# Patient Record
Sex: FEMALE | Race: WHITE | NOT HISPANIC OR LATINO | URBAN - NONMETROPOLITAN AREA
[De-identification: names, ages, dates, MRNs, and addresses within clinical notes are randomized per-mention and may not be internally consistent; named-entity substitution may affect disease eponyms.]

---

## 2021-01-26 ENCOUNTER — OFFICE VISIT (OUTPATIENT)
Dept: URBAN - NONMETROPOLITAN AREA CLINIC 13 | Facility: CLINIC | Age: 71
End: 2021-01-26
Payer: MEDICARE

## 2021-01-26 DIAGNOSIS — R11.0 NAUSEA: ICD-10-CM

## 2021-01-26 DIAGNOSIS — K62.5 BRIGHT RED BLOOD PER RECTUM: ICD-10-CM

## 2021-01-26 DIAGNOSIS — K59.00 CONSTIPATION, UNSPECIFIED CONSTIPATION TYPE: ICD-10-CM

## 2021-01-26 DIAGNOSIS — K21.9 GERD: ICD-10-CM

## 2021-01-26 PROCEDURE — G8427 DOCREV CUR MEDS BY ELIG CLIN: HCPCS | Performed by: NURSE PRACTITIONER

## 2021-01-26 PROCEDURE — G8420 CALC BMI NORM PARAMETERS: HCPCS | Performed by: NURSE PRACTITIONER

## 2021-01-26 PROCEDURE — 3017F COLORECTAL CA SCREEN DOC REV: CPT | Performed by: NURSE PRACTITIONER

## 2021-01-26 PROCEDURE — 99213 OFFICE O/P EST LOW 20 MIN: CPT | Performed by: NURSE PRACTITIONER

## 2021-01-26 RX ORDER — PANTOPRAZOLE SODIUM 40 MG/1
1 TABLET TABLET, DELAYED RELEASE ORAL BID
Qty: 180 TABLET | Refills: 3 | OUTPATIENT
Start: 2021-01-26

## 2021-01-26 RX ORDER — ONDANSETRON HYDROCHLORIDE 4 MG/1
1 TABLET EVERY 6-8HRS AS NEEDED FOR NAUSEA TABLET, FILM COATED ORAL
Qty: 30 | Refills: 3 | OUTPATIENT
Start: 2021-01-26

## 2021-01-26 RX ORDER — CLONAZEPAM 0.5 MG/1
TAKE 1 TABLET BY ORAL ROUTE ONCE A DAY (AT BEDTIME) TABLET ORAL 1
Qty: 0 | Refills: 0 | Status: ACTIVE | COMMUNITY
Start: 1900-01-01

## 2021-01-26 RX ORDER — ESTRADIOL 10 UG/1
INSERT 1 TABLET (10 MCG) BY VAGINAL ROUTE TWICE WEEKLY INSERT VAGINAL
Qty: 1 | Refills: 0 | Status: ACTIVE | COMMUNITY
Start: 1900-01-01

## 2021-01-26 RX ORDER — DICLOFENAC SODIUM 10 MG/G
APPLY 2 GRAM TO THE AFFECTED AREA(S) BY TOPICAL ROUTE 4 TIMES PER DAY GEL TOPICAL
Qty: 1 | Refills: 0 | Status: ACTIVE | COMMUNITY
Start: 1900-01-01

## 2021-01-26 RX ORDER — TRAMADOL HYDROCHLORIDE 50 MG/1
TAKE 1 TABLET (50 MG) BY ORAL ROUTE EVERY 6 HOURS AS NEEDED TABLET ORAL
Qty: 0 | Refills: 0 | Status: ACTIVE | COMMUNITY
Start: 1900-01-01

## 2021-01-26 RX ORDER — LISINOPRIL AND HYDROCHLOROTHIAZIDE TABLETS 10; 12.5 MG/1; MG/1
TAKE 1 TABLET BY ORAL ROUTE ONCE DAILY TABLET ORAL 1
Qty: 0 | Refills: 0 | Status: ACTIVE | COMMUNITY
Start: 1900-01-01

## 2021-01-26 RX ORDER — ADALIMUMAB 40MG/0.8ML
INJECT 0.8 MILLILITER (40 MG) BY SUBCUTANEOUS ROUTE EVERY 2 WEEKS KIT SUBCUTANEOUS
Qty: 1 | Refills: 0 | Status: ACTIVE | COMMUNITY
Start: 1900-01-01

## 2021-01-26 RX ORDER — RABEPRAZOLE SODIUM 20 MG/1
TAKE 1 TABLET (20 MG) BY ORAL ROUTE ONCE DAILY SWALLOWING WHOLE. DO NOT CRUSH, CHEW AND/OR DIVIDE TABLET, DELAYED RELEASE ORAL 1
Qty: 0 | Refills: 0 | Status: ACTIVE | COMMUNITY
Start: 1900-01-01

## 2021-01-26 NOTE — HPI-OTHER HISTORIES
8/21/2019 Bettye is a new patient referred for gastritis. She states that she has had issues with dysphagia over the last few months. She has long standing reflux and has been on aciphex. More recently she feels like this is not as effective. She has solid food dysphagia. She has not had any liquid food dysphagia. She has a history of candidal esophagitis in the past while she is on prednisone. She is on Humira now for RA EGD was essentially normal. negative for PUD or H pylori. Today she returns and is feeling well. She thinks some of her upper Gi sx are anxiety related. Things are somewhat better now. She does not want to change from Aciphex at this point. She is having some constipation. She is using prn Dulcolax. Miralax causedbloating. her weight is stable. 8/21/19 Bettye returns for follow up/for a new issue. She reports that she has had longstanding issues with mild constipation. She has taken dulcolax for this. Now she is requiring addition of miralax and she does notice some increasing bloating and gas with miralax. She had also had worsening loose stools after starting miralax sos she has had to cut back. She is wondering if there are other options. She is not having any blood in her stools. She has a decreased appetite and some foods taste different to her. She denies any otehr abdominal sx. Her upper GI symptoms seem much improved since we saw her in 2017

## 2021-02-08 ENCOUNTER — TELEPHONE ENCOUNTER (OUTPATIENT)
Dept: URBAN - METROPOLITAN AREA CLINIC 92 | Facility: CLINIC | Age: 71
End: 2021-02-08

## 2021-02-08 RX ORDER — DICLOFENAC SODIUM 10 MG/G
APPLY 2 GRAM TO THE AFFECTED AREA(S) BY TOPICAL ROUTE 4 TIMES PER DAY GEL TOPICAL
Qty: 1 | Refills: 0 | Status: ACTIVE | COMMUNITY
Start: 1900-01-01

## 2021-02-08 RX ORDER — CLONAZEPAM 0.5 MG/1
TAKE 1 TABLET BY ORAL ROUTE ONCE A DAY (AT BEDTIME) TABLET ORAL 1
Qty: 0 | Refills: 0 | Status: ACTIVE | COMMUNITY
Start: 1900-01-01

## 2021-02-08 RX ORDER — PANTOPRAZOLE SODIUM 40 MG/1
1 TABLET TABLET, DELAYED RELEASE ORAL BID
Qty: 180 TABLET | Refills: 3 | Status: ACTIVE | COMMUNITY
Start: 2021-01-26

## 2021-02-08 RX ORDER — RABEPRAZOLE SODIUM 20 MG/1
TAKE 1 TABLET (20 MG) BY ORAL ROUTE ONCE DAILY SWALLOWING WHOLE. DO NOT CRUSH, CHEW AND/OR DIVIDE TABLET, DELAYED RELEASE ORAL 1
Qty: 0 | Refills: 0 | Status: ACTIVE | COMMUNITY
Start: 1900-01-01

## 2021-02-08 RX ORDER — AMITRIPTYLINE HYDROCHLORIDE 10 MG/1
1 TABLET AT BEDTIME TABLET, FILM COATED ORAL ONCE A DAY
Qty: 90 TABLET | Refills: 3 | OUTPATIENT
Start: 2021-02-08

## 2021-02-08 RX ORDER — ADALIMUMAB 40MG/0.8ML
INJECT 0.8 MILLILITER (40 MG) BY SUBCUTANEOUS ROUTE EVERY 2 WEEKS KIT SUBCUTANEOUS
Qty: 1 | Refills: 0 | Status: ACTIVE | COMMUNITY
Start: 1900-01-01

## 2021-02-08 RX ORDER — ONDANSETRON HYDROCHLORIDE 4 MG/1
1 TABLET EVERY 6-8HRS AS NEEDED FOR NAUSEA TABLET, FILM COATED ORAL
Qty: 30 | Refills: 3 | Status: ACTIVE | COMMUNITY
Start: 2021-01-26

## 2021-02-08 RX ORDER — ESTRADIOL 10 UG/1
INSERT 1 TABLET (10 MCG) BY VAGINAL ROUTE TWICE WEEKLY INSERT VAGINAL
Qty: 1 | Refills: 0 | Status: ACTIVE | COMMUNITY
Start: 1900-01-01

## 2021-02-08 RX ORDER — TRAMADOL HYDROCHLORIDE 50 MG/1
TAKE 1 TABLET (50 MG) BY ORAL ROUTE EVERY 6 HOURS AS NEEDED TABLET ORAL
Qty: 0 | Refills: 0 | Status: ACTIVE | COMMUNITY
Start: 1900-01-01

## 2021-02-08 RX ORDER — LISINOPRIL AND HYDROCHLOROTHIAZIDE TABLETS 10; 12.5 MG/1; MG/1
TAKE 1 TABLET BY ORAL ROUTE ONCE DAILY TABLET ORAL 1
Qty: 0 | Refills: 0 | Status: ACTIVE | COMMUNITY
Start: 1900-01-01

## 2021-02-16 ENCOUNTER — OFFICE VISIT (OUTPATIENT)
Dept: URBAN - NONMETROPOLITAN AREA CLINIC 13 | Facility: CLINIC | Age: 71
End: 2021-02-16
Payer: MEDICARE

## 2021-02-16 ENCOUNTER — LAB OUTSIDE AN ENCOUNTER (OUTPATIENT)
Dept: URBAN - NONMETROPOLITAN AREA CLINIC 13 | Facility: CLINIC | Age: 71
End: 2021-02-16

## 2021-02-16 VITALS
SYSTOLIC BLOOD PRESSURE: 139 MMHG | TEMPERATURE: 98 F | BODY MASS INDEX: 22.98 KG/M2 | DIASTOLIC BLOOD PRESSURE: 85 MMHG | WEIGHT: 143 LBS | HEART RATE: 73 BPM | HEIGHT: 66 IN

## 2021-02-16 DIAGNOSIS — K21.9 GERD: ICD-10-CM

## 2021-02-16 DIAGNOSIS — R11.0 NAUSEA: ICD-10-CM

## 2021-02-16 DIAGNOSIS — K59.00 CONSTIPATION, UNSPECIFIED CONSTIPATION TYPE: ICD-10-CM

## 2021-02-16 DIAGNOSIS — K62.5 BRIGHT RED BLOOD PER RECTUM: ICD-10-CM

## 2021-02-16 PROCEDURE — 3017F COLORECTAL CA SCREEN DOC REV: CPT | Performed by: NURSE PRACTITIONER

## 2021-02-16 PROCEDURE — G8427 DOCREV CUR MEDS BY ELIG CLIN: HCPCS | Performed by: NURSE PRACTITIONER

## 2021-02-16 PROCEDURE — G8420 CALC BMI NORM PARAMETERS: HCPCS | Performed by: NURSE PRACTITIONER

## 2021-02-16 PROCEDURE — 99213 OFFICE O/P EST LOW 20 MIN: CPT | Performed by: NURSE PRACTITIONER

## 2021-02-16 RX ORDER — AMITRIPTYLINE HYDROCHLORIDE 10 MG/1
1 TABLET AT BEDTIME TABLET, FILM COATED ORAL ONCE A DAY
Qty: 90 TABLET | Refills: 3 | Status: ACTIVE | COMMUNITY
Start: 2021-02-08

## 2021-02-16 RX ORDER — ONDANSETRON HYDROCHLORIDE 4 MG/1
1 TABLET EVERY 6-8HRS AS NEEDED FOR NAUSEA TABLET, FILM COATED ORAL
Qty: 30 | Refills: 3 | Status: ACTIVE | COMMUNITY
Start: 2021-01-26

## 2021-02-16 RX ORDER — ADALIMUMAB 40MG/0.8ML
INJECT 0.8 MILLILITER (40 MG) BY SUBCUTANEOUS ROUTE EVERY 2 WEEKS KIT SUBCUTANEOUS
Qty: 1 | Refills: 0 | Status: ACTIVE | COMMUNITY
Start: 1900-01-01

## 2021-02-16 RX ORDER — DICLOFENAC SODIUM 10 MG/G
APPLY 2 GRAM TO THE AFFECTED AREA(S) BY TOPICAL ROUTE 4 TIMES PER DAY GEL TOPICAL
Qty: 1 | Refills: 0 | Status: ACTIVE | COMMUNITY
Start: 1900-01-01

## 2021-02-16 RX ORDER — LISINOPRIL AND HYDROCHLOROTHIAZIDE TABLETS 10; 12.5 MG/1; MG/1
TAKE 1 TABLET BY ORAL ROUTE ONCE DAILY TABLET ORAL 1
Qty: 0 | Refills: 0 | Status: ACTIVE | COMMUNITY
Start: 1900-01-01

## 2021-02-16 RX ORDER — PANTOPRAZOLE SODIUM 40 MG/1
1 TABLET TABLET, DELAYED RELEASE ORAL BID
Qty: 180 TABLET | Refills: 3 | OUTPATIENT

## 2021-02-16 RX ORDER — ONDANSETRON HYDROCHLORIDE 4 MG/1
1 TABLET EVERY 6-8HRS AS NEEDED FOR NAUSEA TABLET, FILM COATED ORAL
Qty: 30 | Refills: 3 | OUTPATIENT

## 2021-02-16 RX ORDER — CLONAZEPAM 0.5 MG/1
TAKE 1 TABLET BY ORAL ROUTE ONCE A DAY (AT BEDTIME) TABLET ORAL 1
Qty: 0 | Refills: 0 | Status: ACTIVE | COMMUNITY
Start: 1900-01-01

## 2021-02-16 RX ORDER — TRAMADOL HYDROCHLORIDE 50 MG/1
TAKE 1 TABLET (50 MG) BY ORAL ROUTE EVERY 6 HOURS AS NEEDED TABLET ORAL
Qty: 0 | Refills: 0 | Status: ACTIVE | COMMUNITY
Start: 1900-01-01

## 2021-02-16 RX ORDER — PANTOPRAZOLE SODIUM 40 MG/1
1 TABLET TABLET, DELAYED RELEASE ORAL BID
Qty: 180 TABLET | Refills: 3 | Status: ACTIVE | COMMUNITY
Start: 2021-01-26

## 2021-02-16 RX ORDER — RABEPRAZOLE SODIUM 20 MG/1
TAKE 1 TABLET (20 MG) BY ORAL ROUTE ONCE DAILY SWALLOWING WHOLE. DO NOT CRUSH, CHEW AND/OR DIVIDE TABLET, DELAYED RELEASE ORAL 1
Qty: 0 | Refills: 0 | Status: ACTIVE | COMMUNITY
Start: 1900-01-01

## 2021-02-16 RX ORDER — ESTRADIOL 10 UG/1
INSERT 1 TABLET (10 MCG) BY VAGINAL ROUTE TWICE WEEKLY INSERT VAGINAL
Qty: 1 | Refills: 0 | Status: ACTIVE | COMMUNITY
Start: 1900-01-01

## 2021-02-16 NOTE — HPI-OTHER HISTORIES
8/21/2019 Bettye is a new patient referred for gastritis. She states that she has had issues with dysphagia over the last few months. She has long standing reflux and has been on aciphex. More recently she feels like this is not as effective. She has solid food dysphagia. She has not had any liquid food dysphagia. She has a history of candidal esophagitis in the past while she is on prednisone. She is on Humira now for RA EGD was essentially normal. negative for PUD or H pylori. Today she returns and is feeling well. She thinks some of her upper Gi sx are anxiety related. Things are somewhat better now. She does not want to change from Aciphex at this point. She is having some constipation. She is using prn Dulcolax. Miralax causedbloating. her weight is stable. 8/21/19 Bettye returns for follow up/for a new issue. She reports that she has had longstanding issues with mild constipation. She has taken dulcolax for this. Now she is requiring addition of miralax and she does notice some increasing bloating and gas with miralax. She had also had worsening loose stools after starting miralax sos she has had to cut back. She is wondering if there are other options. She is not having any blood in her stools. She has a decreased appetite and some foods taste different to her. She denies any otehr abdominal sx. Her upper GI symptoms seem much improved since we saw her in 2017   1/26/2021 Ms. Bettye Coto is here for nausea, loss of appetite, reflux, and constipation. She was last seen for reflux in 2017 with a normal EGD. She has been on aciphex and her symptoms were thought to be anxiety driven. She started having nausea and loss of appetite just before [place]. Foods just did not taste good. She admits to having a lot of stress at that time.  She was seen in 2019 for constipation. She had a colonoscopy that was normal except for a looping colon. She has been on miralax 0.5 cap a day. She has had trouble regulating her bowels. If she takes too much miralax she will have diarrhea and if too little she will have hard balls. She is having a lot of gas and rumbling. She has seen a small amount of blood when she wipes. She has a hx of internal hemorrhoids. CS

## 2021-02-16 NOTE — HPI-TODAY'S VISIT:
2/16/2021 Ms. Bettye Coto is here for nausea, loss of appetite, reflux, and constipation. She was last seen for reflux in 2017 with a normal EGD. She has been on aciphex and her symptoms were thought to be anxiety driven. She started having nausea and loss of appetite just before [place]. Foods just did not taste good. She was changed from aciphex to protonix 40mg BID. After 2 weeks, her reflux got worse. She started having a lot of regurgitation and worse nausea. She called the office and was changed back to aciphex and given AMT. She took two doses and stopped. She was having severe dry mouth and sedation.  She had a colonoscopy in 2019 that was normal except for a looping colon. She has been working on finding the right dose of miralax and this is working well. CS

## 2021-03-11 ENCOUNTER — OFFICE VISIT (OUTPATIENT)
Dept: URBAN - NONMETROPOLITAN AREA SURGERY CENTER 1 | Facility: SURGERY CENTER | Age: 71
End: 2021-03-11
Payer: MEDICARE

## 2021-03-11 ENCOUNTER — CLAIMS CREATED FROM THE CLAIM WINDOW (OUTPATIENT)
Dept: URBAN - METROPOLITAN AREA CLINIC 4 | Facility: CLINIC | Age: 71
End: 2021-03-11
Payer: MEDICARE

## 2021-03-11 DIAGNOSIS — T47.8X5A ADVERSE EFFECT OF OTHER AGENTS PRIMARILY AFFECTING GASTROINTESTINAL SYSTEM, INITIAL ENCOUNTER: ICD-10-CM

## 2021-03-11 DIAGNOSIS — K31.7 BENIGN GASTRIC POLYP: ICD-10-CM

## 2021-03-11 DIAGNOSIS — R11.0 CHRONIC NAUSEA: ICD-10-CM

## 2021-03-11 PROCEDURE — 43239 EGD BIOPSY SINGLE/MULTIPLE: CPT | Performed by: INTERNAL MEDICINE

## 2021-03-11 PROCEDURE — 88305 TISSUE EXAM BY PATHOLOGIST: CPT | Performed by: PATHOLOGY

## 2021-03-11 PROCEDURE — G8907 PT DOC NO EVENTS ON DISCHARG: HCPCS | Performed by: INTERNAL MEDICINE

## 2021-03-11 PROCEDURE — 88312 SPECIAL STAINS GROUP 1: CPT | Performed by: PATHOLOGY

## 2021-04-01 ENCOUNTER — OFFICE VISIT (OUTPATIENT)
Dept: URBAN - NONMETROPOLITAN AREA CLINIC 13 | Facility: CLINIC | Age: 71
End: 2021-04-01
Payer: MEDICARE

## 2021-04-01 VITALS
HEIGHT: 66 IN | BODY MASS INDEX: 22.69 KG/M2 | HEART RATE: 92 BPM | SYSTOLIC BLOOD PRESSURE: 148 MMHG | DIASTOLIC BLOOD PRESSURE: 84 MMHG | TEMPERATURE: 97.6 F | WEIGHT: 141.2 LBS

## 2021-04-01 DIAGNOSIS — R11.0 NAUSEA: ICD-10-CM

## 2021-04-01 DIAGNOSIS — K21.9 GERD: ICD-10-CM

## 2021-04-01 DIAGNOSIS — K59.00 CONSTIPATION, UNSPECIFIED CONSTIPATION TYPE: ICD-10-CM

## 2021-04-01 DIAGNOSIS — K62.5 BRIGHT RED BLOOD PER RECTUM: ICD-10-CM

## 2021-04-01 PROCEDURE — 99213 OFFICE O/P EST LOW 20 MIN: CPT | Performed by: NURSE PRACTITIONER

## 2021-04-01 RX ORDER — PANTOPRAZOLE SODIUM 40 MG/1
1 TABLET TABLET, DELAYED RELEASE ORAL BID
Qty: 180 TABLET | Refills: 3 | Status: ACTIVE | COMMUNITY

## 2021-04-01 RX ORDER — RABEPRAZOLE SODIUM 20 MG/1
TAKE 1 TABLET (20 MG) BY ORAL ROUTE ONCE DAILY SWALLOWING WHOLE. DO NOT CRUSH, CHEW AND/OR DIVIDE TABLET, DELAYED RELEASE ORAL 1
Qty: 0 | Refills: 0 | Status: ACTIVE | COMMUNITY
Start: 1900-01-01

## 2021-04-01 RX ORDER — ONDANSETRON HYDROCHLORIDE 4 MG/1
1 TABLET EVERY 6-8HRS AS NEEDED FOR NAUSEA TABLET, FILM COATED ORAL
Qty: 30 | Refills: 3 | Status: ACTIVE | COMMUNITY

## 2021-04-01 RX ORDER — LISINOPRIL AND HYDROCHLOROTHIAZIDE TABLETS 10; 12.5 MG/1; MG/1
TAKE 1 TABLET BY ORAL ROUTE ONCE DAILY TABLET ORAL 1
Qty: 0 | Refills: 0 | Status: ACTIVE | COMMUNITY
Start: 1900-01-01

## 2021-04-01 RX ORDER — CLONAZEPAM 0.5 MG/1
TAKE 1 TABLET BY ORAL ROUTE ONCE A DAY (AT BEDTIME) TABLET ORAL 1
Qty: 0 | Refills: 0 | Status: ACTIVE | COMMUNITY
Start: 1900-01-01

## 2021-04-01 RX ORDER — ONDANSETRON HYDROCHLORIDE 4 MG/1
1 TABLET EVERY 6-8HRS AS NEEDED FOR NAUSEA TABLET, FILM COATED ORAL
Qty: 30 | Refills: 3 | OUTPATIENT

## 2021-04-01 RX ORDER — ESTRADIOL 10 UG/1
_INSERT 1 TABLET (10 MCG) BY VAGINAL ROUTE TWICE WEEKLY INSERT VAGINAL
Qty: 1 | Refills: 0 | Status: ACTIVE | COMMUNITY
Start: 1900-01-01

## 2021-04-01 RX ORDER — DICLOFENAC SODIUM 10 MG/G
APPLY 2 GRAM TO THE AFFECTED AREA(S) BY TOPICAL ROUTE 4 TIMES PER DAY GEL TOPICAL
Qty: 1 | Refills: 0 | Status: ACTIVE | COMMUNITY
Start: 1900-01-01

## 2021-04-01 RX ORDER — TRAMADOL HYDROCHLORIDE 50 MG/1
TAKE 1 TABLET (50 MG) BY ORAL ROUTE EVERY 6 HOURS AS NEEDED TABLET ORAL
Qty: 0 | Refills: 0 | Status: ACTIVE | COMMUNITY
Start: 1900-01-01

## 2021-04-01 RX ORDER — ADALIMUMAB 40MG/0.8ML
INJECT 0.8 MILLILITER (40 MG) BY SUBCUTANEOUS ROUTE EVERY 2 WEEKS KIT SUBCUTANEOUS
Qty: 1 | Refills: 0 | Status: ACTIVE | COMMUNITY
Start: 1900-01-01

## 2021-04-01 RX ORDER — AMITRIPTYLINE HYDROCHLORIDE 10 MG/1
1 TABLET AT BEDTIME TABLET, FILM COATED ORAL ONCE A DAY
Qty: 90 TABLET | Refills: 3 | Status: ACTIVE | COMMUNITY
Start: 2021-02-08

## 2021-04-01 NOTE — HPI-OTHER HISTORIES
8/21/2019 Bettye is a new patient referred for gastritis. She states that she has had issues with dysphagia over the last few months. She has long standing reflux and has been on aciphex. More recently she feels like this is not as effective. She has solid food dysphagia. She has not had any liquid food dysphagia. She has a history of candidal esophagitis in the past while she is on prednisone. She is on Humira now for RA EGD was essentially normal. negative for PUD or H pylori. Today she returns and is feeling well. She thinks some of her upper Gi sx are anxiety related. Things are somewhat better now. She does not want to change from Aciphex at this point. She is having some constipation. She is using prn Dulcolax. Miralax causedbloating. her weight is stable. 8/21/19 Bettye returns for follow up/for a new issue. She reports that she has had longstanding issues with mild constipation. She has taken dulcolax for this. Now she is requiring addition of miralax and she does notice some increasing bloating and gas with miralax. She had also had worsening loose stools after starting miralax sos she has had to cut back. She is wondering if there are other options. She is not having any blood in her stools. She has a decreased appetite and some foods taste different to her. She denies any otehr abdominal sx. Her upper GI symptoms seem much improved since we saw her in 2017   1/26/2021 Ms. Bettye Coto is here for nausea, loss of appetite, reflux, and constipation. She was last seen for reflux in 2017 with a normal EGD. She has been on aciphex and her symptoms were thought to be anxiety driven. She started having nausea and loss of appetite just before [place]. Foods just did not taste good. She admits to having a lot of stress at that time.  She was seen in 2019 for constipation. She had a colonoscopy that was normal except for a looping colon. She has been on miralax 0.5 cap a day. She has had trouble regulating her bowels. If she takes too much miralax she will have diarrhea and if too little she will have hard balls. She is having a lot of gas and rumbling. She has seen a small amount of blood when she wipes. She has a hx of internal hemorrhoids. CS  2/16/2021 Ms. Bettye Coto is here for nausea, loss of appetite, reflux, and constipation. She was last seen for reflux in 2017 with a normal EGD. She has been on aciphex and her symptoms were thought to be anxiety driven. She started having nausea and loss of appetite just before [place]. Foods just did not taste good. She was changed from aciphex to protonix 40mg BID. After 2 weeks, her reflux got worse. She started having a lot of regurgitation and worse nausea. She called the office and was changed back to aciphex and given AMT. She took two doses and stopped. She was having severe dry mouth and sedation.  She had a colonoscopy in 2019 that was normal except for a looping colon. She has been working on finding the right dose of miralax and this is working well. CS

## 2021-04-01 NOTE — HPI-TODAY'S VISIT:
4/1/2021 Ms. Bettye Coto is here for f/u of nausea, loss of appetite, reflux, and constipation. She had been on aciphex and doing well until just before [place]. She started having nausea and loss of appetite. She was changed from aciphex to protonix 40mg BID. After 2 weeks, her reflux got worse with regurgitation and nausea. She was changed back to aciphex and AMT was started. She took two doses  of the AMT and stopped due to severe dry mouth and sedation. Her EGD was repeated with mild gastritis. Today, she feels her reflux is better with back on the aciphex. She is still having some nasuea nad scared to eat. Her weight is down a few more pounds. She wonders if gluten is the issue. She has not been able to tolerate Buspar in the past. She is seeing her PCP later this month and will talk to him about her stress/anxiety. CS

## 2021-07-01 ENCOUNTER — OFFICE VISIT (OUTPATIENT)
Dept: URBAN - NONMETROPOLITAN AREA CLINIC 13 | Facility: CLINIC | Age: 71
End: 2021-07-01
Payer: MEDICARE

## 2021-07-01 ENCOUNTER — WEB ENCOUNTER (OUTPATIENT)
Dept: URBAN - NONMETROPOLITAN AREA CLINIC 13 | Facility: CLINIC | Age: 71
End: 2021-07-01

## 2021-07-01 VITALS
HEIGHT: 66 IN | TEMPERATURE: 97.7 F | SYSTOLIC BLOOD PRESSURE: 136 MMHG | DIASTOLIC BLOOD PRESSURE: 79 MMHG | HEART RATE: 89 BPM | BODY MASS INDEX: 23.01 KG/M2 | WEIGHT: 143.2 LBS

## 2021-07-01 DIAGNOSIS — K62.5 BRIGHT RED BLOOD PER RECTUM: ICD-10-CM

## 2021-07-01 DIAGNOSIS — R11.0 NAUSEA: ICD-10-CM

## 2021-07-01 DIAGNOSIS — K59.00 CONSTIPATION, UNSPECIFIED CONSTIPATION TYPE: ICD-10-CM

## 2021-07-01 DIAGNOSIS — K21.9 GERD: ICD-10-CM

## 2021-07-01 PROCEDURE — 99213 OFFICE O/P EST LOW 20 MIN: CPT | Performed by: INTERNAL MEDICINE

## 2021-07-01 RX ORDER — ONDANSETRON HYDROCHLORIDE 4 MG/1
1 TABLET EVERY 6-8HRS AS NEEDED FOR NAUSEA TABLET, FILM COATED ORAL
Qty: 30 | Refills: 3 | OUTPATIENT

## 2021-07-01 RX ORDER — LISINOPRIL AND HYDROCHLOROTHIAZIDE TABLETS 10; 12.5 MG/1; MG/1
TAKE 1 TABLET BY ORAL ROUTE ONCE DAILY TABLET ORAL 1
Qty: 0 | Refills: 0 | Status: ACTIVE | COMMUNITY
Start: 1900-01-01

## 2021-07-01 RX ORDER — CLONAZEPAM 0.5 MG/1
TAKE 1 TABLET BY ORAL ROUTE ONCE A DAY (AT BEDTIME) TABLET ORAL 1
Qty: 0 | Refills: 0 | Status: ACTIVE | COMMUNITY
Start: 1900-01-01

## 2021-07-01 RX ORDER — RABEPRAZOLE SODIUM 20 MG/1
TAKE 1 TABLET (20 MG) BY ORAL ROUTE ONCE DAILY SWALLOWING WHOLE. DO NOT CRUSH, CHEW AND/OR DIVIDE TABLET, DELAYED RELEASE ORAL 1
Qty: 0 | Refills: 0 | Status: ACTIVE | COMMUNITY
Start: 1900-01-01

## 2021-07-01 RX ORDER — ESTRADIOL 10 UG/1
_INSERT 1 TABLET (10 MCG) BY VAGINAL ROUTE TWICE WEEKLY INSERT VAGINAL
Qty: 1 | Refills: 0 | Status: ACTIVE | COMMUNITY
Start: 1900-01-01

## 2021-07-01 RX ORDER — ADALIMUMAB 40MG/0.8ML
INJECT 0.8 MILLILITER (40 MG) BY SUBCUTANEOUS ROUTE EVERY 2 WEEKS KIT SUBCUTANEOUS
Qty: 1 | Refills: 0 | Status: ACTIVE | COMMUNITY
Start: 1900-01-01

## 2021-07-01 RX ORDER — AMITRIPTYLINE HYDROCHLORIDE 10 MG/1
1 TABLET AT BEDTIME TABLET, FILM COATED ORAL ONCE A DAY
Qty: 90 TABLET | Refills: 3 | Status: ACTIVE | COMMUNITY
Start: 2021-02-08

## 2021-07-01 RX ORDER — DICLOFENAC SODIUM 10 MG/G
APPLY 2 GRAM TO THE AFFECTED AREA(S) BY TOPICAL ROUTE 4 TIMES PER DAY GEL TOPICAL
Qty: 1 | Refills: 0 | Status: ACTIVE | COMMUNITY
Start: 1900-01-01

## 2021-07-01 RX ORDER — TRAMADOL HYDROCHLORIDE 50 MG/1
TAKE 1 TABLET (50 MG) BY ORAL ROUTE EVERY 6 HOURS AS NEEDED TABLET ORAL
Qty: 0 | Refills: 0 | Status: ACTIVE | COMMUNITY
Start: 1900-01-01

## 2021-07-01 RX ORDER — ONDANSETRON HYDROCHLORIDE 4 MG/1
1 TABLET EVERY 6-8HRS AS NEEDED FOR NAUSEA TABLET, FILM COATED ORAL
Qty: 30 | Refills: 3 | Status: ACTIVE | COMMUNITY

## 2021-07-01 RX ORDER — PANTOPRAZOLE SODIUM 40 MG/1
1 TABLET TABLET, DELAYED RELEASE ORAL BID
Qty: 180 TABLET | Refills: 3 | Status: ACTIVE | COMMUNITY

## 2021-07-01 NOTE — HPI-OTHER HISTORIES
8/21/2019 Bettye is a new patient referred for gastritis. She states that she has had issues with dysphagia over the last few months. She has long standing reflux and has been on aciphex. More recently she feels like this is not as effective. She has solid food dysphagia. She has not had any liquid food dysphagia. She has a history of candidal esophagitis in the past while she is on prednisone. She is on Humira now for RA EGD was essentially normal. negative for PUD or H pylori. Today she returns and is feeling well. She thinks some of her upper Gi sx are anxiety related. Things are somewhat better now. She does not want to change from Aciphex at this point. She is having some constipation. She is using prn Dulcolax. Miralax causedbloating. her weight is stable. 8/21/19 Bettye returns for follow up/for a new issue. She reports that she has had longstanding issues with mild constipation. She has taken dulcolax for this. Now she is requiring addition of miralax and she does notice some increasing bloating and gas with miralax. She had also had worsening loose stools after starting miralax sos she has had to cut back. She is wondering if there are other options. She is not having any blood in her stools. She has a decreased appetite and some foods taste different to her. She denies any otehr abdominal sx. Her upper GI symptoms seem much improved since we saw her in 2017   1/26/2021 Ms. Bettye Coto is here for nausea, loss of appetite, reflux, and constipation. She was last seen for reflux in 2017 with a normal EGD. She has been on aciphex and her symptoms were thought to be anxiety driven. She started having nausea and loss of appetite just before [place]. Foods just did not taste good. She admits to having a lot of stress at that time.  She was seen in 2019 for constipation. She had a colonoscopy that was normal except for a looping colon. She has been on miralax 0.5 cap a day. She has had trouble regulating her bowels. If she takes too much miralax she will have diarrhea and if too little she will have hard balls. She is having a lot of gas and rumbling. She has seen a small amount of blood when she wipes. She has a hx of internal hemorrhoids. CS  2/16/2021 Ms. Bettye Coto is here for nausea, loss of appetite, reflux, and constipation. She was last seen for reflux in 2017 with a normal EGD. She has been on aciphex and her symptoms were thought to be anxiety driven. She started having nausea and loss of appetite just before [place]. Foods just did not taste good. She was changed from aciphex to protonix 40mg BID. After 2 weeks, her reflux got worse. She started having a lot of regurgitation and worse nausea. She called the office and was changed back to aciphex and given AMT. She took two doses and stopped. She was having severe dry mouth and sedation.  She had a colonoscopy in 2019 that was normal except for a looping colon. She has been working on finding the right dose of miralax and this is working well. CS   4/1/2021 Ms. Bettye Coto is here for f/u of nausea, loss of appetite, reflux, and constipation. She had been on aciphex and doing well until just before [place]. She started having nausea and loss of appetite. She was changed from aciphex to protonix 40mg BID. After 2 weeks, her reflux got worse with regurgitation and nausea. She was changed back to aciphex and AMT was started. She took two doses  of the AMT and stopped due to severe dry mouth and sedation. Her EGD was repeated with mild gastritis. Today, she feels her reflux is better with back on the aciphex. She is still having some nasuea nad scared to eat. Her weight is down a few more pounds. She wonders if gluten is the issue. She has not been able to tolerate Buspar in the past. She is seeing her PCP later this month and will talk to him about her stress/anxiety. CS

## 2021-07-01 NOTE — HPI-TODAY'S VISIT:
7/1/2021 Ms. Bettye Coto is here for f/u of nausea, loss of appetite, reflux, and constipation. Since her last OV, her reflux and nausea had been well controlled. Her appetite had also improved. She had only one episode of nausea after eating steak and peppers.  Her constipation is well controlled with fiber and miralax. The miralax every 3-4 weeks works a little too well and causes diarrhea. CS

## 2021-12-30 ENCOUNTER — OFFICE VISIT (OUTPATIENT)
Dept: URBAN - NONMETROPOLITAN AREA CLINIC 13 | Facility: CLINIC | Age: 71
End: 2021-12-30
Payer: MEDICARE

## 2021-12-30 DIAGNOSIS — R15.0 INCOMPLETE DEFECATION: ICD-10-CM

## 2021-12-30 DIAGNOSIS — K62.5 BRIGHT RED BLOOD PER RECTUM: ICD-10-CM

## 2021-12-30 DIAGNOSIS — K21.9 GERD: ICD-10-CM

## 2021-12-30 DIAGNOSIS — K59.09 CHRONIC CONSTIPATION: ICD-10-CM

## 2021-12-30 PROCEDURE — 99214 OFFICE O/P EST MOD 30 MIN: CPT | Performed by: NURSE PRACTITIONER

## 2021-12-30 RX ORDER — DICYCLOMINE HYDROCHLORIDE 10 MG/1
1 TABLET AS NEEDED FOR CRAMPING/DIARRHEA CAPSULE ORAL THREE TIMES A DAY
Qty: 90 TABLET | Refills: 6 | OUTPATIENT
Start: 2021-12-30 | End: 2022-07-28

## 2021-12-30 RX ORDER — RABEPRAZOLE SODIUM 20 MG/1
TAKE 1 TABLET (20 MG) BY ORAL ROUTE ONCE DAILY SWALLOWING WHOLE. DO NOT CRUSH, CHEW AND/OR DIVIDE TABLET, DELAYED RELEASE ORAL 1
Qty: 0 | Refills: 0 | COMMUNITY
Start: 1900-01-01

## 2021-12-30 RX ORDER — ADALIMUMAB 40MG/0.8ML
INJECT 0.8 MILLILITER (40 MG) BY SUBCUTANEOUS ROUTE EVERY 2 WEEKS KIT SUBCUTANEOUS
Qty: 1 | Refills: 0 | COMMUNITY
Start: 1900-01-01

## 2021-12-30 RX ORDER — LISINOPRIL AND HYDROCHLOROTHIAZIDE TABLETS 10; 12.5 MG/1; MG/1
TAKE 1 TABLET BY ORAL ROUTE ONCE DAILY TABLET ORAL 1
Qty: 0 | Refills: 0 | COMMUNITY
Start: 1900-01-01

## 2021-12-30 RX ORDER — ONDANSETRON HYDROCHLORIDE 4 MG/1
1 TABLET EVERY 6-8HRS AS NEEDED FOR NAUSEA TABLET, FILM COATED ORAL
Qty: 30 | Refills: 3 | COMMUNITY

## 2021-12-30 RX ORDER — PANTOPRAZOLE SODIUM 40 MG/1
1 TABLET TABLET, DELAYED RELEASE ORAL BID
Qty: 180 TABLET | Refills: 3 | COMMUNITY

## 2021-12-30 RX ORDER — ESTRADIOL 10 UG/1
_INSERT 1 TABLET (10 MCG) BY VAGINAL ROUTE TWICE WEEKLY INSERT VAGINAL
Qty: 1 | Refills: 0 | COMMUNITY
Start: 1900-01-01

## 2021-12-30 RX ORDER — DICLOFENAC SODIUM 10 MG/G
APPLY 2 GRAM TO THE AFFECTED AREA(S) BY TOPICAL ROUTE 4 TIMES PER DAY GEL TOPICAL
Qty: 1 | Refills: 0 | COMMUNITY
Start: 1900-01-01

## 2021-12-30 RX ORDER — AMITRIPTYLINE HYDROCHLORIDE 10 MG/1
1 TABLET AT BEDTIME TABLET, FILM COATED ORAL ONCE A DAY
Qty: 90 TABLET | Refills: 3 | COMMUNITY
Start: 2021-02-08

## 2021-12-30 RX ORDER — ONDANSETRON HYDROCHLORIDE 4 MG/1
1 TABLET EVERY 6-8HRS AS NEEDED FOR NAUSEA TABLET, FILM COATED ORAL
Qty: 30 | Refills: 3 | OUTPATIENT

## 2021-12-30 RX ORDER — CLONAZEPAM 0.5 MG/1
TAKE 1 TABLET BY ORAL ROUTE ONCE A DAY (AT BEDTIME) TABLET ORAL 1
Qty: 0 | Refills: 0 | COMMUNITY
Start: 1900-01-01

## 2021-12-30 RX ORDER — TRAMADOL HYDROCHLORIDE 50 MG/1
TAKE 1 TABLET (50 MG) BY ORAL ROUTE EVERY 6 HOURS AS NEEDED TABLET ORAL
Qty: 0 | Refills: 0 | COMMUNITY
Start: 1900-01-01

## 2021-12-30 NOTE — HPI-OTHER HISTORIES
8/21/2019 Bettye is a new patient referred for gastritis. She states that she has had issues with dysphagia over the last few months. She has long standing reflux and has been on aciphex. More recently she feels like this is not as effective. She has solid food dysphagia. She has not had any liquid food dysphagia. She has a history of candidal esophagitis in the past while she is on prednisone. She is on Humira now for RA EGD was essentially normal. negative for PUD or H pylori. Today she returns and is feeling well. She thinks some of her upper Gi sx are anxiety related. Things are somewhat better now. She does not want to change from Aciphex at this point. She is having some constipation. She is using prn Dulcolax. Miralax causedbloating. her weight is stable. 8/21/19 Bettye returns for follow up/for a new issue. She reports that she has had longstanding issues with mild constipation. She has taken dulcolax for this. Now she is requiring addition of miralax and she does notice some increasing bloating and gas with miralax. She had also had worsening loose stools after starting miralax sos she has had to cut back. She is wondering if there are other options. She is not having any blood in her stools. She has a decreased appetite and some foods taste different to her. She denies any otehr abdominal sx. Her upper GI symptoms seem much improved since we saw her in 2017   1/26/2021 Ms. Bettye Coto is here for nausea, loss of appetite, reflux, and constipation. She was last seen for reflux in 2017 with a normal EGD. She has been on aciphex and her symptoms were thought to be anxiety driven. She started having nausea and loss of appetite just before [place]. Foods just did not taste good. She admits to having a lot of stress at that time.  She was seen in 2019 for constipation. She had a colonoscopy that was normal except for a looping colon. She has been on miralax 0.5 cap a day. She has had trouble regulating her bowels. If she takes too much miralax she will have diarrhea and if too little she will have hard balls. She is having a lot of gas and rumbling. She has seen a small amount of blood when she wipes. She has a hx of internal hemorrhoids. CS  2/16/2021 Ms. Bettye Coto is here for nausea, loss of appetite, reflux, and constipation. She was last seen for reflux in 2017 with a normal EGD. She has been on aciphex and her symptoms were thought to be anxiety driven. She started having nausea and loss of appetite just before [place]. Foods just did not taste good. She was changed from aciphex to protonix 40mg BID. After 2 weeks, her reflux got worse. She started having a lot of regurgitation and worse nausea. She called the office and was changed back to aciphex and given AMT. She took two doses and stopped. She was having severe dry mouth and sedation.  She had a colonoscopy in 2019 that was normal except for a looping colon. She has been working on finding the right dose of miralax and this is working well. CS   4/1/2021 Ms. Bettye Coto is here for f/u of nausea, loss of appetite, reflux, and constipation. She had been on aciphex and doing well until just before [place]. She started having nausea and loss of appetite. She was changed from aciphex to protonix 40mg BID. After 2 weeks, her reflux got worse with regurgitation and nausea. She was changed back to aciphex and AMT was started. She took two doses  of the AMT and stopped due to severe dry mouth and sedation. Her EGD was repeated with mild gastritis. Today, she feels her reflux is better with back on the aciphex. She is still having some nasuea nad scared to eat. Her weight is down a few more pounds. She wonders if gluten is the issue. She has not been able to tolerate Buspar in the past. She is seeing her PCP later this month and will talk to him about her stress/anxiety. CS   7/1/2021 Ms. Bettye Coto is here for f/u of nausea, loss of appetite, reflux, and constipation. Since her last OV, her reflux and nausea had been well controlled. Her appetite had also improved. She had only one episode of nausea after eating steak and peppers.  Her constipation is well controlled with fiber and miralax. The miralax every 3-4 weeks works a little too well and causes diarrhea. CS

## 2021-12-30 NOTE — HPI-TODAY'S VISIT:
12/30/2021 Ms. Bettye Coto is here for f/u of nausea, loss of appetite, reflux, and constipation. Today, her reflux is well controlled. She denies any return of nausea or loss of appetite. Her main complaint today is change in stool and fecal incontinence. She is usually constipated and takes fiber and miralax prn. She was started on methotrexate injection and has been under a lot of stress during this holiday season. She is having sticky stools after she eats that is hard to get all out and has had some fecal incontinence. She took half a AMT and it helped. It just made her so sleepy- she did take it during the day. She wonders if she should stop the methotrexate.  She also has started having bladder incontinence and urology told her that her bladder had dropped. CS

## 2022-01-14 ENCOUNTER — OFFICE VISIT (OUTPATIENT)
Dept: URBAN - METROPOLITAN AREA TELEHEALTH 2 | Facility: TELEHEALTH | Age: 72
End: 2022-01-14
Payer: MEDICARE

## 2022-01-14 DIAGNOSIS — R11.0 NAUSEA: ICD-10-CM

## 2022-01-14 DIAGNOSIS — K59.00 CONSTIPATION, UNSPECIFIED CONSTIPATION TYPE: ICD-10-CM

## 2022-01-14 DIAGNOSIS — K62.5 BRIGHT RED BLOOD PER RECTUM: ICD-10-CM

## 2022-01-14 DIAGNOSIS — K21.9 GERD: ICD-10-CM

## 2022-01-14 PROCEDURE — 99213 OFFICE O/P EST LOW 20 MIN: CPT | Performed by: NURSE PRACTITIONER

## 2022-01-14 RX ORDER — DICLOFENAC SODIUM 10 MG/G
APPLY 2 GRAM TO THE AFFECTED AREA(S) BY TOPICAL ROUTE 4 TIMES PER DAY GEL TOPICAL
Qty: 1 | Refills: 0 | COMMUNITY
Start: 1900-01-01

## 2022-01-14 RX ORDER — ONDANSETRON HYDROCHLORIDE 4 MG/1
1 TABLET EVERY 6-8HRS AS NEEDED FOR NAUSEA TABLET, FILM COATED ORAL
Qty: 30 | Refills: 3 | OUTPATIENT

## 2022-01-14 RX ORDER — DICYCLOMINE HYDROCHLORIDE 10 MG/1
1 TABLET AS NEEDED FOR CRAMPING/DIARRHEA CAPSULE ORAL THREE TIMES A DAY
Qty: 90 TABLET | Refills: 6 | COMMUNITY
Start: 2021-12-30 | End: 2022-07-28

## 2022-01-14 RX ORDER — ADALIMUMAB 40MG/0.8ML
INJECT 0.8 MILLILITER (40 MG) BY SUBCUTANEOUS ROUTE EVERY 2 WEEKS KIT SUBCUTANEOUS
Qty: 1 | Refills: 0 | COMMUNITY
Start: 1900-01-01

## 2022-01-14 RX ORDER — DICYCLOMINE HYDROCHLORIDE 10 MG/1
1 TABLET AS NEEDED FOR CRAMPING/DIARRHEA CAPSULE ORAL THREE TIMES A DAY
Qty: 90 TABLET | Refills: 6 | OUTPATIENT

## 2022-01-14 RX ORDER — ESTRADIOL 10 UG/1
_INSERT 1 TABLET (10 MCG) BY VAGINAL ROUTE TWICE WEEKLY INSERT VAGINAL
Qty: 1 | Refills: 0 | COMMUNITY
Start: 1900-01-01

## 2022-01-14 RX ORDER — TRAMADOL HYDROCHLORIDE 50 MG/1
TAKE 1 TABLET (50 MG) BY ORAL ROUTE EVERY 6 HOURS AS NEEDED TABLET ORAL
Qty: 0 | Refills: 0 | COMMUNITY
Start: 1900-01-01

## 2022-01-14 RX ORDER — RABEPRAZOLE SODIUM 20 MG/1
TAKE 1 TABLET (20 MG) BY ORAL ROUTE ONCE DAILY SWALLOWING WHOLE. DO NOT CRUSH, CHEW AND/OR DIVIDE TABLET, DELAYED RELEASE ORAL 1
Qty: 0 | Refills: 0 | COMMUNITY
Start: 1900-01-01

## 2022-01-14 RX ORDER — CLONAZEPAM 0.5 MG/1
TAKE 1 TABLET BY ORAL ROUTE ONCE A DAY (AT BEDTIME) TABLET ORAL 1
Qty: 0 | Refills: 0 | COMMUNITY
Start: 1900-01-01

## 2022-01-14 RX ORDER — ONDANSETRON HYDROCHLORIDE 4 MG/1
1 TABLET EVERY 6-8HRS AS NEEDED FOR NAUSEA TABLET, FILM COATED ORAL
Qty: 30 | Refills: 3 | COMMUNITY

## 2022-01-14 RX ORDER — LISINOPRIL AND HYDROCHLOROTHIAZIDE TABLETS 10; 12.5 MG/1; MG/1
TAKE 1 TABLET BY ORAL ROUTE ONCE DAILY TABLET ORAL 1
Qty: 0 | Refills: 0 | COMMUNITY
Start: 1900-01-01

## 2022-01-14 RX ORDER — AMITRIPTYLINE HYDROCHLORIDE 10 MG/1
1 TABLET AT BEDTIME TABLET, FILM COATED ORAL ONCE A DAY
Qty: 90 TABLET | Refills: 3 | COMMUNITY
Start: 2021-02-08

## 2022-01-14 RX ORDER — PANTOPRAZOLE SODIUM 40 MG/1
1 TABLET TABLET, DELAYED RELEASE ORAL BID
Qty: 180 TABLET | Refills: 3 | COMMUNITY

## 2022-01-14 NOTE — HPI-TODAY'S VISIT:
1/14/2022 Ms. Bettye Coto is evaluated via telehealth for f/u of bowel habit change. At her last OV, she denies any reflux or return of nausea or loss of appetite. She was having sticky stools after eating that was hard to get all out with fecal incontinence. She was told to continue fiber, hold the miralax, and given bentyl. She took bentyl for one day and the BM after eating stopped. She was then back to constipated. She has added back miralax. She is back to having gas and bloating and trouble getting the right miralax dose. CS

## 2022-01-14 NOTE — HPI-OTHER HISTORIES
8/21/2019 Bettye is a new patient referred for gastritis. She states that she has had issues with dysphagia over the last few months. She has long standing reflux and has been on aciphex. More recently she feels like this is not as effective. She has solid food dysphagia. She has not had any liquid food dysphagia. She has a history of candidal esophagitis in the past while she is on prednisone. She is on Humira now for RA EGD was essentially normal. negative for PUD or H pylori. Today she returns and is feeling well. She thinks some of her upper Gi sx are anxiety related. Things are somewhat better now. She does not want to change from Aciphex at this point. She is having some constipation. She is using prn Dulcolax. Miralax causedbloating. her weight is stable. 8/21/19 Bettye returns for follow up/for a new issue. She reports that she has had longstanding issues with mild constipation. She has taken dulcolax for this. Now she is requiring addition of miralax and she does notice some increasing bloating and gas with miralax. She had also had worsening loose stools after starting miralax sos she has had to cut back. She is wondering if there are other options. She is not having any blood in her stools. She has a decreased appetite and some foods taste different to her. She denies any otehr abdominal sx. Her upper GI symptoms seem much improved since we saw her in 2017   1/26/2021 Ms. Bettye Coto is here for nausea, loss of appetite, reflux, and constipation. She was last seen for reflux in 2017 with a normal EGD. She has been on aciphex and her symptoms were thought to be anxiety driven. She started having nausea and loss of appetite just before [place]. Foods just did not taste good. She admits to having a lot of stress at that time.  She was seen in 2019 for constipation. She had a colonoscopy that was normal except for a looping colon. She has been on miralax 0.5 cap a day. She has had trouble regulating her bowels. If she takes too much miralax she will have diarrhea and if too little she will have hard balls. She is having a lot of gas and rumbling. She has seen a small amount of blood when she wipes. She has a hx of internal hemorrhoids. CS  2/16/2021 Ms. Bettye Coto is here for nausea, loss of appetite, reflux, and constipation. She was last seen for reflux in 2017 with a normal EGD. She has been on aciphex and her symptoms were thought to be anxiety driven. She started having nausea and loss of appetite just before [place]. Foods just did not taste good. She was changed from aciphex to protonix 40mg BID. After 2 weeks, her reflux got worse. She started having a lot of regurgitation and worse nausea. She called the office and was changed back to aciphex and given AMT. She took two doses and stopped. She was having severe dry mouth and sedation.  She had a colonoscopy in 2019 that was normal except for a looping colon. She has been working on finding the right dose of miralax and this is working well. CS   4/1/2021 Ms. Bettye Coto is here for f/u of nausea, loss of appetite, reflux, and constipation. She had been on aciphex and doing well until just before [place]. She started having nausea and loss of appetite. She was changed from aciphex to protonix 40mg BID. After 2 weeks, her reflux got worse with regurgitation and nausea. She was changed back to aciphex and AMT was started. She took two doses  of the AMT and stopped due to severe dry mouth and sedation. Her EGD was repeated with mild gastritis. Today, she feels her reflux is better with back on the aciphex. She is still having some nasuea nad scared to eat. Her weight is down a few more pounds. She wonders if gluten is the issue. She has not been able to tolerate Buspar in the past. She is seeing her PCP later this month and will talk to him about her stress/anxiety. CS   7/1/2021 Ms. Bettye Coto is here for f/u of nausea, loss of appetite, reflux, and constipation. Since her last OV, her reflux and nausea had been well controlled. Her appetite had also improved. She had only one episode of nausea after eating steak and peppers.  Her constipation is well controlled with fiber and miralax. The miralax every 3-4 weeks works a little too well and causes diarrhea. CS   12/30/2021 Ms. Bettye Coto is here for f/u of nausea, loss of appetite, reflux, and constipation. Today, her reflux is well controlled. She denies any return of nausea or loss of appetite. Her main complaint today is change in stool and fecal incontinence. She is usually constipated and takes fiber and miralax prn. She was started on methotrexate injection and has been under a lot of stress during this holiday season. She is having sticky stools after she eats that is hard to get all out and has had some fecal incontinence. She took half a AMT and it helped. It just made her so sleepy- she did take it during the day. She wonders if she should stop the methotrexate.  She also has started having bladder incontinence and urology told her that her bladder had dropped. CS

## 2022-01-18 NOTE — PHYSICAL EXAM CONSTITUTIONAL:
well developed, well nourished , in no acute distress , ambulating without difficulty , normal communication ability
English

## 2022-03-11 ENCOUNTER — OFFICE VISIT (OUTPATIENT)
Dept: URBAN - METROPOLITAN AREA TELEHEALTH 2 | Facility: TELEHEALTH | Age: 72
End: 2022-03-11
Payer: MEDICARE

## 2022-03-11 DIAGNOSIS — K62.5 BRIGHT RED BLOOD PER RECTUM: ICD-10-CM

## 2022-03-11 DIAGNOSIS — R15.0 INCOMPLETE DEFECATION: ICD-10-CM

## 2022-03-11 DIAGNOSIS — R11.0 NAUSEA: ICD-10-CM

## 2022-03-11 DIAGNOSIS — K59.09 CHANGE IN BOWEL MOVEMENTS INTERMITTENT CONSTIPATION. URGENCY IN THE MORNING.: ICD-10-CM

## 2022-03-11 DIAGNOSIS — K21.9 GERD: ICD-10-CM

## 2022-03-11 PROCEDURE — 99213 OFFICE O/P EST LOW 20 MIN: CPT | Performed by: NURSE PRACTITIONER

## 2022-03-11 RX ORDER — LISINOPRIL AND HYDROCHLOROTHIAZIDE TABLETS 10; 12.5 MG/1; MG/1
TAKE 1 TABLET BY ORAL ROUTE ONCE DAILY TABLET ORAL 1
Qty: 0 | Refills: 0 | COMMUNITY
Start: 1900-01-01

## 2022-03-11 RX ORDER — ONDANSETRON HYDROCHLORIDE 4 MG/1
1 TABLET EVERY 6-8HRS AS NEEDED FOR NAUSEA TABLET, FILM COATED ORAL
Qty: 30 | Refills: 3 | OUTPATIENT

## 2022-03-11 RX ORDER — DICLOFENAC SODIUM 10 MG/G
APPLY 2 GRAM TO THE AFFECTED AREA(S) BY TOPICAL ROUTE 4 TIMES PER DAY GEL TOPICAL
Qty: 1 | Refills: 0 | COMMUNITY
Start: 1900-01-01

## 2022-03-11 RX ORDER — ESTRADIOL 10 UG/1
_INSERT 1 TABLET (10 MCG) BY VAGINAL ROUTE TWICE WEEKLY INSERT VAGINAL
Qty: 1 | Refills: 0 | COMMUNITY
Start: 1900-01-01

## 2022-03-11 RX ORDER — TRAMADOL HYDROCHLORIDE 50 MG/1
TAKE 1 TABLET (50 MG) BY ORAL ROUTE EVERY 6 HOURS AS NEEDED TABLET ORAL
Qty: 0 | Refills: 0 | COMMUNITY
Start: 1900-01-01

## 2022-03-11 RX ORDER — RABEPRAZOLE SODIUM 20 MG/1
TAKE 1 TABLET (20 MG) BY ORAL ROUTE ONCE DAILY SWALLOWING WHOLE. DO NOT CRUSH, CHEW AND/OR DIVIDE TABLET, DELAYED RELEASE ORAL 1
Qty: 0 | Refills: 0 | COMMUNITY
Start: 1900-01-01

## 2022-03-11 RX ORDER — PANTOPRAZOLE SODIUM 40 MG/1
1 TABLET TABLET, DELAYED RELEASE ORAL BID
Qty: 180 TABLET | Refills: 3 | COMMUNITY

## 2022-03-11 RX ORDER — ONDANSETRON HYDROCHLORIDE 4 MG/1
1 TABLET EVERY 6-8HRS AS NEEDED FOR NAUSEA TABLET, FILM COATED ORAL
Qty: 30 | Refills: 3 | COMMUNITY

## 2022-03-11 RX ORDER — CLONAZEPAM 0.5 MG/1
TAKE 1 TABLET BY ORAL ROUTE ONCE A DAY (AT BEDTIME) TABLET ORAL 1
Qty: 0 | Refills: 0 | COMMUNITY
Start: 1900-01-01

## 2022-03-11 RX ORDER — AMITRIPTYLINE HYDROCHLORIDE 10 MG/1
1 TABLET AT BEDTIME TABLET, FILM COATED ORAL ONCE A DAY
Qty: 90 TABLET | Refills: 3 | COMMUNITY
Start: 2021-02-08

## 2022-03-11 RX ORDER — ADALIMUMAB 40MG/0.8ML
INJECT 0.8 MILLILITER (40 MG) BY SUBCUTANEOUS ROUTE EVERY 2 WEEKS KIT SUBCUTANEOUS
Qty: 1 | Refills: 0 | COMMUNITY
Start: 1900-01-01

## 2022-03-11 RX ORDER — DICYCLOMINE HYDROCHLORIDE 10 MG/1
1 TABLET AS NEEDED FOR CRAMPING/DIARRHEA CAPSULE ORAL THREE TIMES A DAY
Qty: 90 TABLET | Refills: 6 | COMMUNITY

## 2022-03-11 RX ORDER — DICYCLOMINE HYDROCHLORIDE 10 MG/1
1 TABLET AS NEEDED FOR CRAMPING/DIARRHEA CAPSULE ORAL THREE TIMES A DAY
Qty: 90 TABLET | Refills: 6 | OUTPATIENT

## 2022-03-11 NOTE — HPI-OTHER HISTORIES
8/21/2019 Bettye is a new patient referred for gastritis. She states that she has had issues with dysphagia over the last few months. She has long standing reflux and has been on aciphex. More recently she feels like this is not as effective. She has solid food dysphagia. She has not had any liquid food dysphagia. She has a history of candidal esophagitis in the past while she is on prednisone. She is on Humira now for RA EGD was essentially normal. negative for PUD or H pylori. Today she returns and is feeling well. She thinks some of her upper Gi sx are anxiety related. Things are somewhat better now. She does not want to change from Aciphex at this point. She is having some constipation. She is using prn Dulcolax. Miralax causedbloating. her weight is stable. 8/21/19 Bettye returns for follow up/for a new issue. She reports that she has had longstanding issues with mild constipation. She has taken dulcolax for this. Now she is requiring addition of miralax and she does notice some increasing bloating and gas with miralax. She had also had worsening loose stools after starting miralax sos she has had to cut back. She is wondering if there are other options. She is not having any blood in her stools. She has a decreased appetite and some foods taste different to her. She denies any otehr abdominal sx. Her upper GI symptoms seem much improved since we saw her in 2017   1/26/2021 Ms. Bettye Coto is here for nausea, loss of appetite, reflux, and constipation. She was last seen for reflux in 2017 with a normal EGD. She has been on aciphex and her symptoms were thought to be anxiety driven. She started having nausea and loss of appetite just before [place]. Foods just did not taste good. She admits to having a lot of stress at that time.  She was seen in 2019 for constipation. She had a colonoscopy that was normal except for a looping colon. She has been on miralax 0.5 cap a day. She has had trouble regulating her bowels. If she takes too much miralax she will have diarrhea and if too little she will have hard balls. She is having a lot of gas and rumbling. She has seen a small amount of blood when she wipes. She has a hx of internal hemorrhoids. CS  2/16/2021 Ms. Bettye Coto is here for nausea, loss of appetite, reflux, and constipation. She was last seen for reflux in 2017 with a normal EGD. She has been on aciphex and her symptoms were thought to be anxiety driven. She started having nausea and loss of appetite just before [place]. Foods just did not taste good. She was changed from aciphex to protonix 40mg BID. After 2 weeks, her reflux got worse. She started having a lot of regurgitation and worse nausea. She called the office and was changed back to aciphex and given AMT. She took two doses and stopped. She was having severe dry mouth and sedation.  She had a colonoscopy in 2019 that was normal except for a looping colon. She has been working on finding the right dose of miralax and this is working well. CS   4/1/2021 Ms. Bettye Coto is here for f/u of nausea, loss of appetite, reflux, and constipation. She had been on aciphex and doing well until just before [place]. She started having nausea and loss of appetite. She was changed from aciphex to protonix 40mg BID. After 2 weeks, her reflux got worse with regurgitation and nausea. She was changed back to aciphex and AMT was started. She took two doses  of the AMT and stopped due to severe dry mouth and sedation. Her EGD was repeated with mild gastritis. Today, she feels her reflux is better with back on the aciphex. She is still having some nasuea nad scared to eat. Her weight is down a few more pounds. She wonders if gluten is the issue. She has not been able to tolerate Buspar in the past. She is seeing her PCP later this month and will talk to him about her stress/anxiety. CS   7/1/2021 Ms. Bettye Coto is here for f/u of nausea, loss of appetite, reflux, and constipation. Since her last OV, her reflux and nausea had been well controlled. Her appetite had also improved. She had only one episode of nausea after eating steak and peppers.  Her constipation is well controlled with fiber and miralax. The miralax every 3-4 weeks works a little too well and causes diarrhea. CS   12/30/2021 Ms. Bettye Coto is here for f/u of nausea, loss of appetite, reflux, and constipation. Today, her reflux is well controlled. She denies any return of nausea or loss of appetite. Her main complaint today is change in stool and fecal incontinence. She is usually constipated and takes fiber and miralax prn. She was started on methotrexate injection and has been under a lot of stress during this holiday season. She is having sticky stools after she eats that is hard to get all out and has had some fecal incontinence. She took half a AMT and it helped. It just made her so sleepy- she did take it during the day. She wonders if she should stop the methotrexate.  She also has started having bladder incontinence and urology told her that her bladder had dropped. CS   1/14/2022 Ms. Bettye Coto is evaluated via telehealth for f/u of bowel habit change. At her last OV, she denies any reflux or return of nausea or loss of appetite. She was having sticky stools after eating that was hard to get all out with fecal incontinence. She was told to continue fiber, hold the miralax, and given bentyl. She took bentyl for one day and the BM after eating stopped. She was then back to constipated. She has added back miralax. She is back to having gas and bloating and trouble getting the right miralax dose. CS

## 2022-03-11 NOTE — HPI-TODAY'S VISIT:
3/11/2022 Ms. Bettye Coto is evaluated via telehealth for f/u of bowel habit change. She is taking aciphex and her reflux is well controlled. She is taking the miralax and her bowels are now normal. If she gets a little backed up she will add gasx but this is rare. Overall, she is doing really well from a GI standpoint. She may need bladder surgery soon. CS

## 2022-09-07 ENCOUNTER — CLAIMS CREATED FROM THE CLAIM WINDOW (OUTPATIENT)
Dept: URBAN - NONMETROPOLITAN AREA CLINIC 2 | Facility: CLINIC | Age: 72
End: 2022-09-07
Payer: MEDICARE

## 2022-09-07 VITALS
HEART RATE: 84 BPM | SYSTOLIC BLOOD PRESSURE: 136 MMHG | HEIGHT: 66 IN | WEIGHT: 134 LBS | BODY MASS INDEX: 21.53 KG/M2 | DIASTOLIC BLOOD PRESSURE: 80 MMHG

## 2022-09-07 DIAGNOSIS — K59.09 CHANGE IN BOWEL MOVEMENTS INTERMITTENT CONSTIPATION. URGENCY IN THE MORNING.: ICD-10-CM

## 2022-09-07 DIAGNOSIS — R11.0 NAUSEA: ICD-10-CM

## 2022-09-07 DIAGNOSIS — K62.5 BRIGHT RED BLOOD PER RECTUM: ICD-10-CM

## 2022-09-07 DIAGNOSIS — K58.0 IRRITABLE BOWEL SYNDROME WITH DIARRHEA: ICD-10-CM

## 2022-09-07 DIAGNOSIS — K21.9 GERD: ICD-10-CM

## 2022-09-07 PROCEDURE — 99213 OFFICE O/P EST LOW 20 MIN: CPT | Performed by: NURSE PRACTITIONER

## 2022-09-07 RX ORDER — DOCUSATE SODIUM 100 MG/1
1 CAPSULE AS NEEDED CAPSULE ORAL ONCE A DAY
Status: ACTIVE | COMMUNITY

## 2022-09-07 RX ORDER — ESTRADIOL 10 UG/1
_INSERT 1 TABLET (10 MCG) BY VAGINAL ROUTE TWICE WEEKLY INSERT VAGINAL
Qty: 1 | Refills: 0 | Status: ACTIVE | COMMUNITY
Start: 1900-01-01

## 2022-09-07 RX ORDER — RABEPRAZOLE SODIUM 20 MG/1
TAKE 1 TABLET (20 MG) BY ORAL ROUTE ONCE DAILY SWALLOWING WHOLE. DO NOT CRUSH, CHEW AND/OR DIVIDE TABLET, DELAYED RELEASE ORAL 1
Qty: 90 | Refills: 3

## 2022-09-07 RX ORDER — POLYETHYLENE GLYCOL 3350 17 G/17G
AS DIRECTED POWDER, FOR SOLUTION ORAL
Status: ACTIVE | COMMUNITY

## 2022-09-07 RX ORDER — ONDANSETRON HYDROCHLORIDE 4 MG/1
1 TABLET EVERY 6-8HRS AS NEEDED FOR NAUSEA TABLET, FILM COATED ORAL
Qty: 30 | Refills: 3 | OUTPATIENT

## 2022-09-07 RX ORDER — DICYCLOMINE HYDROCHLORIDE 10 MG/1
1 TABLET AS NEEDED FOR CRAMPING/DIARRHEA CAPSULE ORAL THREE TIMES A DAY
Qty: 90 TABLET | Refills: 6 | Status: DISCONTINUED | COMMUNITY

## 2022-09-07 RX ORDER — CLONAZEPAM 0.5 MG/1
TAKE 1 TABLET BY ORAL ROUTE ONCE A DAY (AT BEDTIME) TABLET ORAL 1
Qty: 0 | Refills: 0 | Status: ACTIVE | COMMUNITY
Start: 1900-01-01

## 2022-09-07 RX ORDER — LISINOPRIL AND HYDROCHLOROTHIAZIDE TABLETS 10; 12.5 MG/1; MG/1
TAKE 1 TABLET BY ORAL ROUTE ONCE DAILY TABLET ORAL 1
Qty: 0 | Refills: 0 | Status: DISCONTINUED | COMMUNITY
Start: 1900-01-01

## 2022-09-07 RX ORDER — PANTOPRAZOLE SODIUM 40 MG/1
1 TABLET TABLET, DELAYED RELEASE ORAL BID
Qty: 180 TABLET | Refills: 3 | Status: DISCONTINUED | COMMUNITY

## 2022-09-07 RX ORDER — AMLODIPINE BESYLATE AND BENAZEPRIL HYDROCHLORIDE 5; 20 MG/1; MG/1
AS DIRECTED CAPSULE ORAL
Status: ACTIVE | COMMUNITY

## 2022-09-07 RX ORDER — DICLOFENAC SODIUM 10 MG/G
APPLY 2 GRAM TO THE AFFECTED AREA(S) BY TOPICAL ROUTE 4 TIMES PER DAY GEL TOPICAL
Qty: 1 | Refills: 0 | Status: ACTIVE | COMMUNITY
Start: 1900-01-01

## 2022-09-07 RX ORDER — ONDANSETRON HYDROCHLORIDE 4 MG/1
1 TABLET EVERY 6-8HRS AS NEEDED FOR NAUSEA TABLET, FILM COATED ORAL
Qty: 30 | Refills: 3 | Status: ACTIVE | COMMUNITY

## 2022-09-07 RX ORDER — CHOLECALCIFEROL (VITAMIN D3) 50 MCG
1 CAPSULE CAPSULE ORAL ONCE A DAY
Status: ACTIVE | COMMUNITY

## 2022-09-07 RX ORDER — ADALIMUMAB 40MG/0.8ML
INJECT 0.8 MILLILITER (40 MG) BY SUBCUTANEOUS ROUTE EVERY 2 WEEKS KIT SUBCUTANEOUS
Qty: 1 | Refills: 0 | Status: ACTIVE | COMMUNITY
Start: 1900-01-01

## 2022-09-07 RX ORDER — AMITRIPTYLINE HYDROCHLORIDE 10 MG/1
1 TABLET AT BEDTIME TABLET, FILM COATED ORAL ONCE A DAY
Qty: 90 TABLET | Refills: 3 | Status: DISCONTINUED | COMMUNITY
Start: 2021-02-08

## 2022-09-07 RX ORDER — TRAMADOL HYDROCHLORIDE 50 MG/1
TAKE 1 TABLET (50 MG) BY ORAL ROUTE EVERY 6 HOURS AS NEEDED TABLET ORAL
Qty: 0 | Refills: 0 | Status: ACTIVE | COMMUNITY
Start: 1900-01-01

## 2022-09-07 RX ORDER — DICYCLOMINE HYDROCHLORIDE 10 MG/1
1 TABLET AS NEEDED FOR CRAMPING/DIARRHEA CAPSULE ORAL THREE TIMES A DAY
Qty: 90 TABLET | Refills: 6 | OUTPATIENT

## 2022-09-07 RX ORDER — RABEPRAZOLE SODIUM 20 MG/1
TAKE 1 TABLET (20 MG) BY ORAL ROUTE ONCE DAILY SWALLOWING WHOLE. DO NOT CRUSH, CHEW AND/OR DIVIDE TABLET, DELAYED RELEASE ORAL 1
Qty: 0 | Refills: 0 | Status: ACTIVE | COMMUNITY
Start: 1900-01-01

## 2022-09-07 RX ORDER — EPINASTINE HYDROCHLORIDE 0.5 MG/ML
1 DROP INTO AFFECTED EYE SOLUTION/ DROPS OPHTHALMIC TWICE A DAY
Status: ACTIVE | COMMUNITY

## 2022-09-07 RX ORDER — RIFAXIMIN 550 MG/1
1 TABLET TABLET ORAL THREE TIMES A DAY
Qty: 42 TABLET | Refills: 2 | OUTPATIENT
Start: 2022-09-07 | End: 2022-10-19

## 2022-09-07 NOTE — HPI-OTHER HISTORIES
8/21/2019 Bettye is a new patient referred for gastritis. She states that she has had issues with dysphagia over the last few months. She has long standing reflux and has been on aciphex. More recently she feels like this is not as effective. She has solid food dysphagia. She has not had any liquid food dysphagia. She has a history of candidal esophagitis in the past while she is on prednisone. She is on Humira now for RA EGD was essentially normal. negative for PUD or H pylori. Today she returns and is feeling well. She thinks some of her upper Gi sx are anxiety related. Things are somewhat better now. She does not want to change from Aciphex at this point. She is having some constipation. She is using prn Dulcolax. Miralax causedbloating. her weight is stable. 8/21/19 Bettye returns for follow up/for a new issue. She reports that she has had longstanding issues with mild constipation. She has taken dulcolax for this. Now she is requiring addition of miralax and she does notice some increasing bloating and gas with miralax. She had also had worsening loose stools after starting miralax sos she has had to cut back. She is wondering if there are other options. She is not having any blood in her stools. She has a decreased appetite and some foods taste different to her. She denies any otehr abdominal sx. Her upper GI symptoms seem much improved since we saw her in 2017   1/26/2021 Ms. Bettye Coto is here for nausea, loss of appetite, reflux, and constipation. She was last seen for reflux in 2017 with a normal EGD. She has been on aciphex and her symptoms were thought to be anxiety driven. She started having nausea and loss of appetite just before [place]. Foods just did not taste good. She admits to having a lot of stress at that time.  She was seen in 2019 for constipation. She had a colonoscopy that was normal except for a looping colon. She has been on miralax 0.5 cap a day. She has had trouble regulating her bowels. If she takes too much miralax she will have diarrhea and if too little she will have hard balls. She is having a lot of gas and rumbling. She has seen a small amount of blood when she wipes. She has a hx of internal hemorrhoids. CS  2/16/2021 Ms. Bettye Coto is here for nausea, loss of appetite, reflux, and constipation. She was last seen for reflux in 2017 with a normal EGD. She has been on aciphex and her symptoms were thought to be anxiety driven. She started having nausea and loss of appetite just before [place]. Foods just did not taste good. She was changed from aciphex to protonix 40mg BID. After 2 weeks, her reflux got worse. She started having a lot of regurgitation and worse nausea. She called the office and was changed back to aciphex and given AMT. She took two doses and stopped. She was having severe dry mouth and sedation.  She had a colonoscopy in 2019 that was normal except for a looping colon. She has been working on finding the right dose of miralax and this is working well. CS   4/1/2021 Ms. Bettye Coto is here for f/u of nausea, loss of appetite, reflux, and constipation. She had been on aciphex and doing well until just before [place]. She started having nausea and loss of appetite. She was changed from aciphex to protonix 40mg BID. After 2 weeks, her reflux got worse with regurgitation and nausea. She was changed back to aciphex and AMT was started. She took two doses  of the AMT and stopped due to severe dry mouth and sedation. Her EGD was repeated with mild gastritis. Today, she feels her reflux is better with back on the aciphex. She is still having some nasuea nad scared to eat. Her weight is down a few more pounds. She wonders if gluten is the issue. She has not been able to tolerate Buspar in the past. She is seeing her PCP later this month and will talk to him about her stress/anxiety. CS   7/1/2021 Ms. Bettye Coto is here for f/u of nausea, loss of appetite, reflux, and constipation. Since her last OV, her reflux and nausea had been well controlled. Her appetite had also improved. She had only one episode of nausea after eating steak and peppers.  Her constipation is well controlled with fiber and miralax. The miralax every 3-4 weeks works a little too well and causes diarrhea. CS   12/30/2021 Ms. Bettye Coto is here for f/u of nausea, loss of appetite, reflux, and constipation. Today, her reflux is well controlled. She denies any return of nausea or loss of appetite. Her main complaint today is change in stool and fecal incontinence. She is usually constipated and takes fiber and miralax prn. She was started on methotrexate injection and has been under a lot of stress during this holiday season. She is having sticky stools after she eats that is hard to get all out and has had some fecal incontinence. She took half a AMT and it helped. It just made her so sleepy- she did take it during the day. She wonders if she should stop the methotrexate.  She also has started having bladder incontinence and urology told her that her bladder had dropped. CS   1/14/2022 Ms. Bettye Coto is evaluated via telehealth for f/u of bowel habit change. At her last OV, she denies any reflux or return of nausea or loss of appetite. She was having sticky stools after eating that was hard to get all out with fecal incontinence. She was told to continue fiber, hold the miralax, and given bentyl. She took bentyl for one day and the BM after eating stopped. She was then back to constipated. She has added back miralax. She is back to having gas and bloating and trouble getting the right miralax dose. CS  3/11/2022 Ms. Bettye Coto is evaluated via telehealth for f/u of bowel habit change. She is taking aciphex and her reflux is well controlled. She is taking the miralax and her bowels are now normal. If she gets a little backed up she will add gasx but this is rare. Overall, she is doing really well from a GI standpoint. She may need bladder surgery soon. CS

## 2022-09-07 NOTE — HPI-TODAY'S VISIT:
9/7/2022 Ms. Bettye Coto is here for f/u of GERD, IBS-C, and hospital f/u of SBO. Since her last OV, she has a hysterectomy and pelvic floor surgery by Dr Jett in June. A month later, she started having nausea and diarrhea. She was feeling really weak and dehydrated so she went to the ER. She was found to have a SBO. She had an NGT. After about 7 days, she had a PICC line and TPN. ON day 8, her NGT was removed and her diet was advanced. She did not require surgery. Since ariving home, she has had a lot of gas, bloating, and incomplete BM. She feels constipated but can not have a BM or only a small amount comes out. She is taking fiber daily and miralax prn.  Her  was dx with dementia and this has increased her anxiety and stress level. CS

## 2023-01-11 ENCOUNTER — OFFICE VISIT (OUTPATIENT)
Dept: URBAN - NONMETROPOLITAN AREA CLINIC 2 | Facility: CLINIC | Age: 73
End: 2023-01-11

## 2023-01-11 ENCOUNTER — TELEPHONE ENCOUNTER (OUTPATIENT)
Dept: URBAN - NONMETROPOLITAN AREA CLINIC 2 | Facility: CLINIC | Age: 73
End: 2023-01-11

## 2023-01-12 ENCOUNTER — TELEPHONE ENCOUNTER (OUTPATIENT)
Dept: URBAN - NONMETROPOLITAN AREA CLINIC 2 | Facility: CLINIC | Age: 73
End: 2023-01-12

## 2023-01-12 RX ORDER — ADALIMUMAB 40MG/0.8ML
INJECT 0.8 MILLILITER (40 MG) BY SUBCUTANEOUS ROUTE EVERY 2 WEEKS KIT SUBCUTANEOUS
Qty: 1 | Refills: 0 | Status: ACTIVE | COMMUNITY
Start: 1900-01-01

## 2023-01-12 RX ORDER — ESTRADIOL 10 UG/1
_INSERT 1 TABLET (10 MCG) BY VAGINAL ROUTE TWICE WEEKLY INSERT VAGINAL
Qty: 1 | Refills: 0 | Status: ACTIVE | COMMUNITY
Start: 1900-01-01

## 2023-01-12 RX ORDER — ONDANSETRON HYDROCHLORIDE 4 MG/1
1 TABLET EVERY 6-8HRS AS NEEDED FOR NAUSEA TABLET, FILM COATED ORAL
Qty: 30 | Refills: 3 | Status: ACTIVE | COMMUNITY

## 2023-01-12 RX ORDER — CHOLECALCIFEROL (VITAMIN D3) 50 MCG
1 CAPSULE CAPSULE ORAL ONCE A DAY
Status: ACTIVE | COMMUNITY

## 2023-01-12 RX ORDER — DICLOFENAC SODIUM 10 MG/G
APPLY 2 GRAM TO THE AFFECTED AREA(S) BY TOPICAL ROUTE 4 TIMES PER DAY GEL TOPICAL
Qty: 1 | Refills: 0 | Status: ACTIVE | COMMUNITY
Start: 1900-01-01

## 2023-01-12 RX ORDER — METRONIDAZOLE 500 MG/1
1 TABLET TABLET, FILM COATED ORAL THREE TIMES A DAY
Qty: 42 TABLET | Refills: 0 | OUTPATIENT
Start: 2023-01-12 | End: 2023-01-26

## 2023-01-12 RX ORDER — AMLODIPINE BESYLATE AND BENAZEPRIL HYDROCHLORIDE 5; 20 MG/1; MG/1
AS DIRECTED CAPSULE ORAL
Status: ACTIVE | COMMUNITY

## 2023-01-12 RX ORDER — RABEPRAZOLE SODIUM 20 MG/1
TAKE 1 TABLET (20 MG) BY ORAL ROUTE ONCE DAILY SWALLOWING WHOLE. DO NOT CRUSH, CHEW AND/OR DIVIDE TABLET, DELAYED RELEASE ORAL 1
Qty: 90 | Refills: 3 | Status: ACTIVE | COMMUNITY

## 2023-01-12 RX ORDER — DICYCLOMINE HYDROCHLORIDE 10 MG/1
1 TABLET AS NEEDED FOR CRAMPING/DIARRHEA CAPSULE ORAL THREE TIMES A DAY
Qty: 90 TABLET | Refills: 6 | Status: ACTIVE | COMMUNITY

## 2023-01-12 RX ORDER — POLYETHYLENE GLYCOL 3350 17 G/17G
AS DIRECTED POWDER, FOR SOLUTION ORAL
Status: ACTIVE | COMMUNITY

## 2023-01-12 RX ORDER — EPINASTINE HYDROCHLORIDE 0.5 MG/ML
1 DROP INTO AFFECTED EYE SOLUTION/ DROPS OPHTHALMIC TWICE A DAY
Status: ACTIVE | COMMUNITY

## 2023-01-12 RX ORDER — FLUCONAZOLE 150 MG/1
1 TABLET TABLET ORAL
Qty: 2 | Refills: 1 | OUTPATIENT
Start: 2023-01-12 | End: 2023-01-18

## 2023-01-12 RX ORDER — TRAMADOL HYDROCHLORIDE 50 MG/1
TAKE 1 TABLET (50 MG) BY ORAL ROUTE EVERY 6 HOURS AS NEEDED TABLET ORAL
Qty: 0 | Refills: 0 | Status: ACTIVE | COMMUNITY
Start: 1900-01-01

## 2023-01-12 RX ORDER — DOCUSATE SODIUM 100 MG/1
1 CAPSULE AS NEEDED CAPSULE ORAL ONCE A DAY
Status: ACTIVE | COMMUNITY

## 2023-01-12 RX ORDER — CLONAZEPAM 0.5 MG/1
TAKE 1 TABLET BY ORAL ROUTE ONCE A DAY (AT BEDTIME) TABLET ORAL 1
Qty: 0 | Refills: 0 | Status: ACTIVE | COMMUNITY
Start: 1900-01-01

## 2023-01-25 ENCOUNTER — CLAIMS CREATED FROM THE CLAIM WINDOW (OUTPATIENT)
Dept: URBAN - NONMETROPOLITAN AREA CLINIC 13 | Facility: CLINIC | Age: 73
End: 2023-01-25
Payer: MEDICARE

## 2023-01-25 VITALS
WEIGHT: 137 LBS | SYSTOLIC BLOOD PRESSURE: 122 MMHG | HEIGHT: 66 IN | DIASTOLIC BLOOD PRESSURE: 80 MMHG | HEART RATE: 89 BPM | BODY MASS INDEX: 22.02 KG/M2

## 2023-01-25 DIAGNOSIS — R11.0 NAUSEA: ICD-10-CM

## 2023-01-25 DIAGNOSIS — K58.0 IRRITABLE BOWEL SYNDROME WITH DIARRHEA: ICD-10-CM

## 2023-01-25 DIAGNOSIS — K59.00 CONSTIPATION, UNSPECIFIED CONSTIPATION TYPE: ICD-10-CM

## 2023-01-25 DIAGNOSIS — K62.5 BRIGHT RED BLOOD PER RECTUM: ICD-10-CM

## 2023-01-25 DIAGNOSIS — K56.609 SBO (SMALL BOWEL OBSTRUCTION): ICD-10-CM

## 2023-01-25 DIAGNOSIS — K21.9 GERD: ICD-10-CM

## 2023-01-25 PROBLEM — 197125005: Status: ACTIVE | Noted: 2022-09-07

## 2023-01-25 PROCEDURE — 99213 OFFICE O/P EST LOW 20 MIN: CPT | Performed by: NURSE PRACTITIONER

## 2023-01-25 RX ORDER — DICYCLOMINE HYDROCHLORIDE 10 MG/1
1 TABLET AS NEEDED FOR CRAMPING/DIARRHEA CAPSULE ORAL THREE TIMES A DAY
Qty: 90 TABLET | Refills: 6 | Status: ACTIVE | COMMUNITY

## 2023-01-25 RX ORDER — TRAMADOL HYDROCHLORIDE 50 MG/1
TAKE 1 TABLET (50 MG) BY ORAL ROUTE EVERY 6 HOURS AS NEEDED TABLET ORAL
Qty: 0 | Refills: 0 | Status: ACTIVE | COMMUNITY
Start: 1900-01-01

## 2023-01-25 RX ORDER — RABEPRAZOLE SODIUM 20 MG/1
TAKE 1 TABLET (20 MG) BY ORAL ROUTE ONCE DAILY SWALLOWING WHOLE. DO NOT CRUSH, CHEW AND/OR DIVIDE TABLET, DELAYED RELEASE ORAL 1
Qty: 90 | Refills: 3

## 2023-01-25 RX ORDER — CLONAZEPAM 0.5 MG/1
TAKE 1 TABLET BY ORAL ROUTE ONCE A DAY (AT BEDTIME) TABLET ORAL 1
Qty: 0 | Refills: 0 | Status: ACTIVE | COMMUNITY
Start: 1900-01-01

## 2023-01-25 RX ORDER — ONDANSETRON HYDROCHLORIDE 4 MG/1
1 TABLET EVERY 6-8HRS AS NEEDED FOR NAUSEA TABLET, FILM COATED ORAL
Qty: 30 | Refills: 3 | OUTPATIENT

## 2023-01-25 RX ORDER — DICLOFENAC SODIUM 10 MG/G
APPLY 2 GRAM TO THE AFFECTED AREA(S) BY TOPICAL ROUTE 4 TIMES PER DAY GEL TOPICAL
Qty: 1 | Refills: 0 | Status: ACTIVE | COMMUNITY
Start: 1900-01-01

## 2023-01-25 RX ORDER — DOCUSATE SODIUM 100 MG/1
1 CAPSULE AS NEEDED CAPSULE ORAL ONCE A DAY
Status: ACTIVE | COMMUNITY

## 2023-01-25 RX ORDER — EPINASTINE HYDROCHLORIDE 0.5 MG/ML
1 DROP INTO AFFECTED EYE SOLUTION/ DROPS OPHTHALMIC TWICE A DAY
Status: ACTIVE | COMMUNITY

## 2023-01-25 RX ORDER — RABEPRAZOLE SODIUM 20 MG/1
TAKE 1 TABLET (20 MG) BY ORAL ROUTE ONCE DAILY SWALLOWING WHOLE. DO NOT CRUSH, CHEW AND/OR DIVIDE TABLET, DELAYED RELEASE ORAL 1
Qty: 90 | Refills: 3 | Status: ACTIVE | COMMUNITY

## 2023-01-25 RX ORDER — ONDANSETRON HYDROCHLORIDE 4 MG/1
1 TABLET EVERY 6-8HRS AS NEEDED FOR NAUSEA TABLET, FILM COATED ORAL
Qty: 30 | Refills: 3 | Status: ACTIVE | COMMUNITY

## 2023-01-25 RX ORDER — METRONIDAZOLE 500 MG/1
1 TABLET TABLET, FILM COATED ORAL THREE TIMES A DAY
Qty: 42 TABLET | Refills: 0 | Status: ACTIVE | COMMUNITY
Start: 2023-01-12 | End: 2023-01-26

## 2023-01-25 RX ORDER — CHOLECALCIFEROL (VITAMIN D3) 50 MCG
1 CAPSULE CAPSULE ORAL ONCE A DAY
Status: ACTIVE | COMMUNITY

## 2023-01-25 RX ORDER — POLYETHYLENE GLYCOL 3350 17 G/17G
AS DIRECTED POWDER, FOR SOLUTION ORAL
Status: ACTIVE | COMMUNITY

## 2023-01-25 RX ORDER — ADALIMUMAB 40MG/0.8ML
INJECT 0.8 MILLILITER (40 MG) BY SUBCUTANEOUS ROUTE EVERY 2 WEEKS KIT SUBCUTANEOUS
Qty: 1 | Refills: 0 | Status: ACTIVE | COMMUNITY
Start: 1900-01-01

## 2023-01-25 RX ORDER — ESTRADIOL 10 UG/1
_INSERT 1 TABLET (10 MCG) BY VAGINAL ROUTE TWICE WEEKLY INSERT VAGINAL
Qty: 1 | Refills: 0 | Status: ACTIVE | COMMUNITY
Start: 1900-01-01

## 2023-01-25 RX ORDER — AMLODIPINE BESYLATE AND BENAZEPRIL HYDROCHLORIDE 5; 20 MG/1; MG/1
AS DIRECTED CAPSULE ORAL
Status: ACTIVE | COMMUNITY

## 2023-01-25 NOTE — HPI-OTHER HISTORIES
8/21/2019 Bettye is a new patient referred for gastritis. She states that she has had issues with dysphagia over the last few months. She has long standing reflux and has been on aciphex. More recently she feels like this is not as effective. She has solid food dysphagia. She has not had any liquid food dysphagia. She has a history of candidal esophagitis in the past while she is on prednisone. She is on Humira now for RA EGD was essentially normal. negative for PUD or H pylori. Today she returns and is feeling well. She thinks some of her upper Gi sx are anxiety related. Things are somewhat better now. She does not want to change from Aciphex at this point. She is having some constipation. She is using prn Dulcolax. Miralax causedbloating. her weight is stable. 8/21/19 Bettye returns for follow up/for a new issue. She reports that she has had longstanding issues with mild constipation. She has taken dulcolax for this. Now she is requiring addition of miralax and she does notice some increasing bloating and gas with miralax. She had also had worsening loose stools after starting miralax sos she has had to cut back. She is wondering if there are other options. She is not having any blood in her stools. She has a decreased appetite and some foods taste different to her. She denies any otehr abdominal sx. Her upper GI symptoms seem much improved since we saw her in 2017   1/26/2021 Ms. Bettye Coto is here for nausea, loss of appetite, reflux, and constipation. She was last seen for reflux in 2017 with a normal EGD. She has been on aciphex and her symptoms were thought to be anxiety driven. She started having nausea and loss of appetite just before [place]. Foods just did not taste good. She admits to having a lot of stress at that time.  She was seen in 2019 for constipation. She had a colonoscopy that was normal except for a looping colon. She has been on miralax 0.5 cap a day. She has had trouble regulating her bowels. If she takes too much miralax she will have diarrhea and if too little she will have hard balls. She is having a lot of gas and rumbling. She has seen a small amount of blood when she wipes. She has a hx of internal hemorrhoids. CS  2/16/2021 Ms. Bettye Coto is here for nausea, loss of appetite, reflux, and constipation. She was last seen for reflux in 2017 with a normal EGD. She has been on aciphex and her symptoms were thought to be anxiety driven. She started having nausea and loss of appetite just before [place]. Foods just did not taste good. She was changed from aciphex to protonix 40mg BID. After 2 weeks, her reflux got worse. She started having a lot of regurgitation and worse nausea. She called the office and was changed back to aciphex and given AMT. She took two doses and stopped. She was having severe dry mouth and sedation.  She had a colonoscopy in 2019 that was normal except for a looping colon. She has been working on finding the right dose of miralax and this is working well. CS   4/1/2021 Ms. Bettye Coto is here for f/u of nausea, loss of appetite, reflux, and constipation. She had been on aciphex and doing well until just before [place]. She started having nausea and loss of appetite. She was changed from aciphex to protonix 40mg BID. After 2 weeks, her reflux got worse with regurgitation and nausea. She was changed back to aciphex and AMT was started. She took two doses  of the AMT and stopped due to severe dry mouth and sedation. Her EGD was repeated with mild gastritis. Today, she feels her reflux is better with back on the aciphex. She is still having some nasuea nad scared to eat. Her weight is down a few more pounds. She wonders if gluten is the issue. She has not been able to tolerate Buspar in the past. She is seeing her PCP later this month and will talk to him about her stress/anxiety. CS   7/1/2021 Ms. Bettye Coto is here for f/u of nausea, loss of appetite, reflux, and constipation. Since her last OV, her reflux and nausea had been well controlled. Her appetite had also improved. She had only one episode of nausea after eating steak and peppers.  Her constipation is well controlled with fiber and miralax. The miralax every 3-4 weeks works a little too well and causes diarrhea. CS   12/30/2021 Ms. Bettye Coto is here for f/u of nausea, loss of appetite, reflux, and constipation. Today, her reflux is well controlled. She denies any return of nausea or loss of appetite. Her main complaint today is change in stool and fecal incontinence. She is usually constipated and takes fiber and miralax prn. She was started on methotrexate injection and has been under a lot of stress during this holiday season. She is having sticky stools after she eats that is hard to get all out and has had some fecal incontinence. She took half a AMT and it helped. It just made her so sleepy- she did take it during the day. She wonders if she should stop the methotrexate.  She also has started having bladder incontinence and urology told her that her bladder had dropped. CS   1/14/2022 Ms. Bettye Coto is evaluated via telehealth for f/u of bowel habit change. At her last OV, she denies any reflux or return of nausea or loss of appetite. She was having sticky stools after eating that was hard to get all out with fecal incontinence. She was told to continue fiber, hold the miralax, and given bentyl. She took bentyl for one day and the BM after eating stopped. She was then back to constipated. She has added back miralax. She is back to having gas and bloating and trouble getting the right miralax dose. CS  3/11/2022 Ms. Bettye Coto is evaluated via telehealth for f/u of bowel habit change. She is taking aciphex and her reflux is well controlled. She is taking the miralax and her bowels are now normal. If she gets a little backed up she will add gasx but this is rare. Overall, she is doing really well from a GI standpoint. She may need bladder surgery soon. CS  9/7/2022 Ms. Bettye Coto is here for f/u of GERD, IBS-C, and hospital f/u of SBO. Since her last OV, she has a hysterectomy and pelvic floor surgery by Dr Jett in June. A month later, she started having nausea and diarrhea. She was feeling really weak and dehydrated so she went to the ER. She was found to have a SBO. She had an NGT. After about 7 days, she had a PICC line and TPN. ON day 8, her NGT was removed and her diet was advanced. She did not require surgery. Since ariving home, she has had a lot of gas, bloating, and incomplete BM. She feels constipated but can not have a BM or only a small amount comes out. She is taking fiber daily and miralax prn.  Her  was dx with dementia and this has increased her anxiety and stress level. CS

## 2023-01-25 NOTE — HPI-TODAY'S VISIT:
1/25/2023 Ms. Bettye Coto is here for f/u of GERD, IBS-C, and SBO. Last summer, she had a hysterectomy and pelvic floor surgery by Dr Jett in June.  She started having nausea and diarrhea and found to have a SBO. This was felt to be surgery related. After the SBO, she had a lot of gas, bloating, and incomplete BM. She was given a round of xifaxan and her bloating and gas improved until she went to a holiday party in December. She was given a round of flagyl and this helped. Today, her main issue is constipation. The miralax takes a week to work and then works too well. She has been eating very little veggies. Nutrition supplements have helped and her weight is up.  Her  was dx with dementia and this has increased her anxiety and stress level. CS

## 2023-03-14 NOTE — HPI-TODAY'S VISIT:
1/26/2021 Ms. Bettye Coto is here for nausea, loss of appetite, reflux, and constipation. She was last seen for reflux in 2017 with a normal EGD. She has been on aciphex and her symptoms were thought to be anxiety driven. She started having nausea and loss of appetite just before [place]. Foods just did not taste good. She admits to having a lot of stress at that time.  She was seen in 2019 for constipation. She had a colonoscopy that was normal except for a looping colon. She has been on miralax 0.5 cap a day. She has had trouble regulating her bowels. If she takes too much miralax she will have diarrhea and if too little she will have hard balls. She is having a lot of gas and rumbling. She has seen a small amount of blood when she wipes. She has a hx of internal hemorrhoids. CS [de-identified] : Mr. Vick is a 80 year-old male with ADRIEL, HTN, BPH, ITP, macrocytosis, and chronic atrial fibrillation who underwent single chamber pacemaker implantation ([place]) in 2007 for high grade AV block.  He underwent generator replacement in 2017.  Prior care with Dr. Levine.  He reports feeling well and denies chest pain, discomfort over the device site, palpitations, SOB, MARTIN, LE edema, orthopnea, PND, and syncope.  He reports a stable exercise tolerance and compliance with his medication regimen.  He has discussed transitioning to a NOAC with Dr. Sidhu and the plan is to continue Warfarin at this point.\par \par SEE PACE[facility]\par \par TTE 10/2019 showed LVEF 58%, basal septal hypertrophy, LA enlargement. No significant valvular disease.

## 2023-05-22 ENCOUNTER — OFFICE VISIT (OUTPATIENT)
Dept: URBAN - NONMETROPOLITAN AREA CLINIC 13 | Facility: CLINIC | Age: 73
End: 2023-05-22
Payer: MEDICARE

## 2023-05-22 VITALS
SYSTOLIC BLOOD PRESSURE: 142 MMHG | HEART RATE: 81 BPM | HEIGHT: 66 IN | DIASTOLIC BLOOD PRESSURE: 86 MMHG | WEIGHT: 137.2 LBS | BODY MASS INDEX: 22.05 KG/M2

## 2023-05-22 DIAGNOSIS — K58.0 IRRITABLE BOWEL SYNDROME WITH DIARRHEA: ICD-10-CM

## 2023-05-22 DIAGNOSIS — K60.2 ANAL FISSURE: ICD-10-CM

## 2023-05-22 DIAGNOSIS — K59.00 CONSTIPATION, UNSPECIFIED CONSTIPATION TYPE: ICD-10-CM

## 2023-05-22 DIAGNOSIS — R11.0 NAUSEA: ICD-10-CM

## 2023-05-22 DIAGNOSIS — K62.5 BRIGHT RED BLOOD PER RECTUM: ICD-10-CM

## 2023-05-22 DIAGNOSIS — K59.04 CHRONIC IDIOPATHIC CONSTIPATION: ICD-10-CM

## 2023-05-22 DIAGNOSIS — K21.9 GERD: ICD-10-CM

## 2023-05-22 DIAGNOSIS — K56.609 SBO (SMALL BOWEL OBSTRUCTION): ICD-10-CM

## 2023-05-22 PROCEDURE — 99213 OFFICE O/P EST LOW 20 MIN: CPT | Performed by: NURSE PRACTITIONER

## 2023-05-22 RX ORDER — FAMOTIDINE 20 MG/1
1 TABLET AT BEDTIME AS NEEDED TABLET, FILM COATED ORAL ONCE A DAY
Qty: 90 TABLET | Refills: 11 | OUTPATIENT
Start: 2023-05-22

## 2023-05-22 RX ORDER — METHENAMINE HIPPURATE 1000 MG/1
1 TABLET TABLET ORAL TWICE A DAY
Status: ACTIVE | COMMUNITY

## 2023-05-22 RX ORDER — AMLODIPINE BESYLATE AND BENAZEPRIL HYDROCHLORIDE 5; 20 MG/1; MG/1
AS DIRECTED CAPSULE ORAL
Status: ACTIVE | COMMUNITY

## 2023-05-22 RX ORDER — POLYETHYLENE GLYCOL 3350 17 G/17G
AS DIRECTED POWDER, FOR SOLUTION ORAL
Status: ACTIVE | COMMUNITY

## 2023-05-22 RX ORDER — ONDANSETRON HYDROCHLORIDE 4 MG/1
1 TABLET EVERY 6-8HRS AS NEEDED FOR NAUSEA TABLET, FILM COATED ORAL
Qty: 30 | Refills: 3 | OUTPATIENT

## 2023-05-22 RX ORDER — DICLOFENAC SODIUM 10 MG/G
APPLY 2 GRAM TO THE AFFECTED AREA(S) BY TOPICAL ROUTE 4 TIMES PER DAY GEL TOPICAL
Qty: 1 | Refills: 0 | Status: ACTIVE | COMMUNITY
Start: 1900-01-01

## 2023-05-22 RX ORDER — PRUCALOPRIDE 2 MG/1
1 TABLET TABLET, FILM COATED ORAL ONCE A DAY
Qty: 90 TABLET | Refills: 3 | OUTPATIENT
Start: 2023-05-22 | End: 2024-05-16

## 2023-05-22 RX ORDER — DOCUSATE SODIUM 100 MG/1
1 CAPSULE AS NEEDED CAPSULE ORAL ONCE A DAY
Status: ACTIVE | COMMUNITY

## 2023-05-22 RX ORDER — CLONAZEPAM 0.5 MG/1
TAKE 1 TABLET BY ORAL ROUTE ONCE A DAY (AT BEDTIME) TABLET ORAL 1
Qty: 0 | Refills: 0 | Status: ACTIVE | COMMUNITY
Start: 1900-01-01

## 2023-05-22 RX ORDER — CHOLECALCIFEROL (VITAMIN D3) 50 MCG
1 CAPSULE CAPSULE ORAL ONCE A DAY
Status: ACTIVE | COMMUNITY

## 2023-05-22 RX ORDER — TRAMADOL HYDROCHLORIDE 50 MG/1
TAKE 1 TABLET (50 MG) BY ORAL ROUTE EVERY 6 HOURS AS NEEDED TABLET ORAL
Qty: 0 | Refills: 0 | Status: ACTIVE | COMMUNITY
Start: 1900-01-01

## 2023-05-22 RX ORDER — DICYCLOMINE HYDROCHLORIDE 10 MG/1
1 TABLET AS NEEDED FOR CRAMPING/DIARRHEA CAPSULE ORAL THREE TIMES A DAY
Qty: 90 TABLET | Refills: 6 | Status: ACTIVE | COMMUNITY

## 2023-05-22 RX ORDER — EPINASTINE HYDROCHLORIDE 0.5 MG/ML
1 DROP INTO AFFECTED EYE SOLUTION/ DROPS OPHTHALMIC TWICE A DAY
Status: ACTIVE | COMMUNITY

## 2023-05-22 RX ORDER — ADALIMUMAB 40MG/0.8ML
INJECT 0.8 MILLILITER (40 MG) BY SUBCUTANEOUS ROUTE EVERY 2 WEEKS KIT SUBCUTANEOUS
Qty: 1 | Refills: 0 | Status: ACTIVE | COMMUNITY
Start: 1900-01-01

## 2023-05-22 RX ORDER — RABEPRAZOLE SODIUM 20 MG/1
TAKE 1 TABLET (20 MG) BY ORAL ROUTE ONCE DAILY SWALLOWING WHOLE. DO NOT CRUSH, CHEW AND/OR DIVIDE TABLET, DELAYED RELEASE ORAL 1
Qty: 90 | Refills: 3 | Status: ACTIVE | COMMUNITY

## 2023-05-22 RX ORDER — ESTRADIOL 10 UG/1
_INSERT 1 TABLET (10 MCG) BY VAGINAL ROUTE TWICE WEEKLY INSERT VAGINAL
Qty: 1 | Refills: 0 | Status: ACTIVE | COMMUNITY
Start: 1900-01-01

## 2023-05-22 RX ORDER — ONDANSETRON HYDROCHLORIDE 4 MG/1
1 TABLET EVERY 6-8HRS AS NEEDED FOR NAUSEA TABLET, FILM COATED ORAL
Qty: 30 | Refills: 3 | Status: ACTIVE | COMMUNITY

## 2023-05-22 RX ORDER — RABEPRAZOLE SODIUM 20 MG/1
TAKE 1 TABLET (20 MG) BY ORAL ROUTE ONCE DAILY SWALLOWING WHOLE. DO NOT CRUSH, CHEW AND/OR DIVIDE TABLET, DELAYED RELEASE ORAL 1
Qty: 90 | Refills: 3

## 2023-05-22 NOTE — HPI-TODAY'S VISIT:
5/22/2023 Ms. Bettye Coto is here for f/u of GERD, IBS-C, and SBO. Last summer, she had a hysterectomy and pelvic floor surgery by Dr Jett in June.  She started having nausea and diarrhea and found to have a SBO. It has taken some time but she is finally starting to feel better. Her weight is stable. She denies any further gas or bloating. She is taking fiber at night and playing with the miralax dose. She has not been able to find the right amount to keep her regular without diarrhea. She is having to wipe a lot and now has a fissure that is painful. CS

## 2023-05-22 NOTE — PHYSICAL EXAM GASTROINTESTINAL
Abdomen , soft, nontender, nondistended , no guarding or rigidity , no masses palpable , normal bowel sounds , rectal exam with painful small fissure, Liver and Spleen , no hepatosplenomegaly , liver nontender , spleen not palpable

## 2023-05-22 NOTE — HPI-OTHER HISTORIES
8/21/2019 Bettye is a new patient referred for gastritis. She states that she has had issues with dysphagia over the last few months. She has long standing reflux and has been on aciphex. More recently she feels like this is not as effective. She has solid food dysphagia. She has not had any liquid food dysphagia. She has a history of candidal esophagitis in the past while she is on prednisone. She is on Humira now for RA EGD was essentially normal. negative for PUD or H pylori. Today she returns and is feeling well. She thinks some of her upper Gi sx are anxiety related. Things are somewhat better now. She does not want to change from Aciphex at this point. She is having some constipation. She is using prn Dulcolax. Miralax causedbloating. her weight is stable. 8/21/19 Bettye returns for follow up/for a new issue. She reports that she has had longstanding issues with mild constipation. She has taken dulcolax for this. Now she is requiring addition of miralax and she does notice some increasing bloating and gas with miralax. She had also had worsening loose stools after starting miralax sos she has had to cut back. She is wondering if there are other options. She is not having any blood in her stools. She has a decreased appetite and some foods taste different to her. She denies any otehr abdominal sx. Her upper GI symptoms seem much improved since we saw her in 2017   1/26/2021 Ms. Bettye Coto is here for nausea, loss of appetite, reflux, and constipation. She was last seen for reflux in 2017 with a normal EGD. She has been on aciphex and her symptoms were thought to be anxiety driven. She started having nausea and loss of appetite just before [place]. Foods just did not taste good. She admits to having a lot of stress at that time.  She was seen in 2019 for constipation. She had a colonoscopy that was normal except for a looping colon. She has been on miralax 0.5 cap a day. She has had trouble regulating her bowels. If she takes too much miralax she will have diarrhea and if too little she will have hard balls. She is having a lot of gas and rumbling. She has seen a small amount of blood when she wipes. She has a hx of internal hemorrhoids. CS  2/16/2021 Ms. Bettye Coto is here for nausea, loss of appetite, reflux, and constipation. She was last seen for reflux in 2017 with a normal EGD. She has been on aciphex and her symptoms were thought to be anxiety driven. She started having nausea and loss of appetite just before [place]. Foods just did not taste good. She was changed from aciphex to protonix 40mg BID. After 2 weeks, her reflux got worse. She started having a lot of regurgitation and worse nausea. She called the office and was changed back to aciphex and given AMT. She took two doses and stopped. She was having severe dry mouth and sedation.  She had a colonoscopy in 2019 that was normal except for a looping colon. She has been working on finding the right dose of miralax and this is working well. CS   4/1/2021 Ms. Bettye Coto is here for f/u of nausea, loss of appetite, reflux, and constipation. She had been on aciphex and doing well until just before [place]. She started having nausea and loss of appetite. She was changed from aciphex to protonix 40mg BID. After 2 weeks, her reflux got worse with regurgitation and nausea. She was changed back to aciphex and AMT was started. She took two doses  of the AMT and stopped due to severe dry mouth and sedation. Her EGD was repeated with mild gastritis. Today, she feels her reflux is better with back on the aciphex. She is still having some nasuea nad scared to eat. Her weight is down a few more pounds. She wonders if gluten is the issue. She has not been able to tolerate Buspar in the past. She is seeing her PCP later this month and will talk to him about her stress/anxiety. CS   7/1/2021 Ms. Bettye Coto is here for f/u of nausea, loss of appetite, reflux, and constipation. Since her last OV, her reflux and nausea had been well controlled. Her appetite had also improved. She had only one episode of nausea after eating steak and peppers.  Her constipation is well controlled with fiber and miralax. The miralax every 3-4 weeks works a little too well and causes diarrhea. CS   12/30/2021 Ms. Bettye Coto is here for f/u of nausea, loss of appetite, reflux, and constipation. Today, her reflux is well controlled. She denies any return of nausea or loss of appetite. Her main complaint today is change in stool and fecal incontinence. She is usually constipated and takes fiber and miralax prn. She was started on methotrexate injection and has been under a lot of stress during this holiday season. She is having sticky stools after she eats that is hard to get all out and has had some fecal incontinence. She took half a AMT and it helped. It just made her so sleepy- she did take it during the day. She wonders if she should stop the methotrexate.  She also has started having bladder incontinence and urology told her that her bladder had dropped. CS   1/14/2022 Ms. Bettye Coto is evaluated via telehealth for f/u of bowel habit change. At her last OV, she denies any reflux or return of nausea or loss of appetite. She was having sticky stools after eating that was hard to get all out with fecal incontinence. She was told to continue fiber, hold the miralax, and given bentyl. She took bentyl for one day and the BM after eating stopped. She was then back to constipated. She has added back miralax. She is back to having gas and bloating and trouble getting the right miralax dose. CS  3/11/2022 Ms. Bettye Coto is evaluated via telehealth for f/u of bowel habit change. She is taking aciphex and her reflux is well controlled. She is taking the miralax and her bowels are now normal. If she gets a little backed up she will add gasx but this is rare. Overall, she is doing really well from a GI standpoint. She may need bladder surgery soon. CS  9/7/2022 Ms. Bettye Coto is here for f/u of GERD, IBS-C, and hospital f/u of SBO. Since her last OV, she has a hysterectomy and pelvic floor surgery by Dr Jett in June. A month later, she started having nausea and diarrhea. She was feeling really weak and dehydrated so she went to the ER. She was found to have a SBO. She had an NGT. After about 7 days, she had a PICC line and TPN. ON day 8, her NGT was removed and her diet was advanced. She did not require surgery. Since ariving home, she has had a lot of gas, bloating, and incomplete BM. She feels constipated but can not have a BM or only a small amount comes out. She is taking fiber daily and miralax prn.  Her  was dx with dementia and this has increased her anxiety and stress level. CS  1/25/2023 Ms. Bettye Coto is here for f/u of GERD, IBS-C, and SBO. Last summer, she had a hysterectomy and pelvic floor surgery by Dr Jett in June.  She started having nausea and diarrhea and found to have a SBO. This was felt to be surgery related. After the SBO, she had a lot of gas, bloating, and incomplete BM. She was given a round of xifaxan and her bloating and gas improved until she went to a holiday party in December. She was given a round of flagyl and this helped. Today, her main issue is constipation. The miralax takes a week to work and then works too well. She has been eating very little veggies. Nutrition supplements have helped and her weight is up.  Her  was dx with dementia and this has increased her anxiety and stress level. CS

## 2023-06-01 ENCOUNTER — TELEPHONE ENCOUNTER (OUTPATIENT)
Dept: URBAN - NONMETROPOLITAN AREA CLINIC 2 | Facility: CLINIC | Age: 73
End: 2023-06-01

## 2023-08-04 ENCOUNTER — APPOINTMENT (OUTPATIENT)
Dept: CT IMAGING | Age: 73
DRG: 871 | End: 2023-08-04
Attending: STUDENT IN AN ORGANIZED HEALTH CARE EDUCATION/TRAINING PROGRAM

## 2023-08-04 ENCOUNTER — HOSPITAL ENCOUNTER (INPATIENT)
Age: 73
LOS: 8 days | Discharge: HOSPICE | DRG: 871 | End: 2023-08-12
Attending: EMERGENCY MEDICINE | Admitting: INTERNAL MEDICINE

## 2023-08-04 ENCOUNTER — APPOINTMENT (OUTPATIENT)
Dept: GENERAL RADIOLOGY | Age: 73
DRG: 871 | End: 2023-08-04
Attending: EMERGENCY MEDICINE

## 2023-08-04 DIAGNOSIS — E83.52 HYPERCALCEMIA: ICD-10-CM

## 2023-08-04 DIAGNOSIS — E87.0 HYPERNATREMIA: ICD-10-CM

## 2023-08-04 DIAGNOSIS — Z71.89 ADVANCED CARE PLANNING/COUNSELING DISCUSSION: ICD-10-CM

## 2023-08-04 DIAGNOSIS — J69.0 ASPIRATION PNEUMONIA OF RIGHT LUNG, UNSPECIFIED ASPIRATION PNEUMONIA TYPE, UNSPECIFIED PART OF LUNG (CMD): Primary | ICD-10-CM

## 2023-08-04 DIAGNOSIS — A41.9 SEPSIS, DUE TO UNSPECIFIED ORGANISM, UNSPECIFIED WHETHER ACUTE ORGAN DYSFUNCTION PRESENT (CMD): ICD-10-CM

## 2023-08-04 DIAGNOSIS — R79.89 ELEVATED TROPONIN: ICD-10-CM

## 2023-08-04 DIAGNOSIS — E86.0 DEHYDRATION: ICD-10-CM

## 2023-08-04 DIAGNOSIS — G93.41 METABOLIC ENCEPHALOPATHY: ICD-10-CM

## 2023-08-04 LAB
ABO + RH BLD: NORMAL
ABO + RH BLD: NORMAL
ALBUMIN SERPL-MCNC: 3.6 G/DL (ref 3.6–5.1)
ALBUMIN/GLOB SERPL: 0.8 {RATIO} (ref 1–2.4)
ALP SERPL-CCNC: 120 UNITS/L (ref 45–117)
ALT SERPL-CCNC: 84 UNITS/L
ANION GAP SERPL CALC-SCNC: 12 MMOL/L (ref 7–19)
APTT PPP: 24 SEC (ref 22–30)
AST SERPL-CCNC: 65 UNITS/L
ATRIAL RATE (BPM): 227
ATRIAL RATE (BPM): 93
BASOPHILS # BLD: 0 K/MCL (ref 0–0.3)
BASOPHILS NFR BLD: 0 %
BILIRUB SERPL-MCNC: 1.6 MG/DL (ref 0.2–1)
BLD GP AB SCN SERPL QL GEL: NEGATIVE
BUN SERPL-MCNC: 36 MG/DL (ref 6–20)
BUN/CREAT SERPL: 40 (ref 7–25)
CALCIUM SERPL-MCNC: 12.1 MG/DL (ref 8.4–10.2)
CHLORIDE SERPL-SCNC: 116 MMOL/L (ref 97–110)
CO2 SERPL-SCNC: 28 MMOL/L (ref 21–32)
CREAT SERPL-MCNC: 0.9 MG/DL (ref 0.51–0.95)
DEPRECATED RDW RBC: 45.9 FL (ref 39–50)
EOSINOPHIL # BLD: 0 K/MCL (ref 0–0.5)
EOSINOPHIL NFR BLD: 0 %
ERYTHROCYTE [DISTWIDTH] IN BLOOD: 14 % (ref 11–15)
FASTING DURATION TIME PATIENT: ABNORMAL H
GFR SERPLBLD BASED ON 1.73 SQ M-ARVRAT: 68 ML/MIN
GLOBULIN SER-MCNC: 4.6 G/DL (ref 2–4)
GLUCOSE SERPL-MCNC: 118 MG/DL (ref 70–99)
HCT VFR BLD CALC: 41.7 % (ref 36–46.5)
HGB BLD-MCNC: 12.8 G/DL (ref 12–15.5)
IMM GRANULOCYTES # BLD AUTO: 0.1 K/MCL (ref 0–0.2)
IMM GRANULOCYTES # BLD: 1 %
INR PPP: 1.2
LACTATE BLDV-SCNC: 2.3 MMOL/L (ref 0–2)
LACTATE BLDV-SCNC: 2.4 MMOL/L (ref 0–2)
LYMPHOCYTES # BLD: 1.1 K/MCL (ref 1–4)
LYMPHOCYTES NFR BLD: 6 %
MAGNESIUM SERPL-MCNC: 2.6 MG/DL (ref 1.7–2.4)
MCH RBC QN AUTO: 28.6 PG (ref 26–34)
MCHC RBC AUTO-ENTMCNC: 30.7 G/DL (ref 32–36.5)
MCV RBC AUTO: 93.3 FL (ref 78–100)
MONOCYTES # BLD: 1 K/MCL (ref 0.3–0.9)
MONOCYTES NFR BLD: 5 %
NEUTROPHILS # BLD: 18 K/MCL (ref 1.8–7.7)
NEUTROPHILS NFR BLD: 88 %
NRBC BLD MANUAL-RTO: 0 /100 WBC
NT-PROBNP SERPL-MCNC: 4842 PG/ML
P AXIS (DEGREES): 86
PLATELET # BLD AUTO: 576 K/MCL (ref 140–450)
POTASSIUM SERPL-SCNC: 4 MMOL/L (ref 3.4–5.1)
PR-INTERVAL (MSEC): 132
PROT SERPL-MCNC: 8.2 G/DL (ref 6.4–8.2)
PROTHROMBIN TIME: 12.3 SEC (ref 9.7–11.8)
QRS-INTERVAL (MSEC): 78
QRS-INTERVAL (MSEC): 82
QT-INTERVAL (MSEC): 240
QT-INTERVAL (MSEC): 346
QTC: 423
QTC: 430
R AXIS (DEGREES): -57
R AXIS (DEGREES): -66
RAINBOW EXTRA TUBES HOLD SPECIMEN: NORMAL
RBC # BLD: 4.47 MIL/MCL (ref 4–5.2)
REPORT TEXT: NORMAL
REPORT TEXT: NORMAL
SODIUM SERPL-SCNC: 152 MMOL/L (ref 135–145)
T AXIS (DEGREES): -25
T AXIS (DEGREES): -81
TROPONIN I SERPL DL<=0.01 NG/ML-MCNC: 168 NG/L
TYPE AND SCREEN EXPIRATION DATE: NORMAL
VENTRICULAR RATE EKG/MIN (BPM): 187
VENTRICULAR RATE EKG/MIN (BPM): 93
WBC # BLD: 20.3 K/MCL (ref 4.2–11)

## 2023-08-04 PROCEDURE — 86901 BLOOD TYPING SEROLOGIC RH(D): CPT | Performed by: EMERGENCY MEDICINE

## 2023-08-04 PROCEDURE — 85025 COMPLETE CBC W/AUTO DIFF WBC: CPT | Performed by: EMERGENCY MEDICINE

## 2023-08-04 PROCEDURE — 74177 CT ABD & PELVIS W/CONTRAST: CPT

## 2023-08-04 PROCEDURE — 10000002 HB ROOM CHARGE MED SURG

## 2023-08-04 PROCEDURE — 10002800 HB RX 250 W HCPCS: Performed by: STUDENT IN AN ORGANIZED HEALTH CARE EDUCATION/TRAINING PROGRAM

## 2023-08-04 PROCEDURE — 36415 COLL VENOUS BLD VENIPUNCTURE: CPT

## 2023-08-04 PROCEDURE — 80053 COMPREHEN METABOLIC PANEL: CPT | Performed by: EMERGENCY MEDICINE

## 2023-08-04 PROCEDURE — 84443 ASSAY THYROID STIM HORMONE: CPT | Performed by: INTERNAL MEDICINE

## 2023-08-04 PROCEDURE — 99284 EMERGENCY DEPT VISIT MOD MDM: CPT | Performed by: EMERGENCY MEDICINE

## 2023-08-04 PROCEDURE — 10002805 HB CONTRAST AGENT: Performed by: STUDENT IN AN ORGANIZED HEALTH CARE EDUCATION/TRAINING PROGRAM

## 2023-08-04 PROCEDURE — 87040 BLOOD CULTURE FOR BACTERIA: CPT | Performed by: EMERGENCY MEDICINE

## 2023-08-04 PROCEDURE — 71045 X-RAY EXAM CHEST 1 VIEW: CPT

## 2023-08-04 PROCEDURE — 93005 ELECTROCARDIOGRAM TRACING: CPT | Performed by: EMERGENCY MEDICINE

## 2023-08-04 PROCEDURE — 83735 ASSAY OF MAGNESIUM: CPT | Performed by: EMERGENCY MEDICINE

## 2023-08-04 PROCEDURE — 93005 ELECTROCARDIOGRAM TRACING: CPT | Performed by: STUDENT IN AN ORGANIZED HEALTH CARE EDUCATION/TRAINING PROGRAM

## 2023-08-04 PROCEDURE — 99285 EMERGENCY DEPT VISIT HI MDM: CPT

## 2023-08-04 PROCEDURE — 83880 ASSAY OF NATRIURETIC PEPTIDE: CPT | Performed by: EMERGENCY MEDICINE

## 2023-08-04 PROCEDURE — 84484 ASSAY OF TROPONIN QUANT: CPT | Performed by: EMERGENCY MEDICINE

## 2023-08-04 PROCEDURE — 85610 PROTHROMBIN TIME: CPT | Performed by: EMERGENCY MEDICINE

## 2023-08-04 PROCEDURE — 10004180 HB COUNTER-TRANSPORT

## 2023-08-04 PROCEDURE — 83605 ASSAY OF LACTIC ACID: CPT | Performed by: EMERGENCY MEDICINE

## 2023-08-04 PROCEDURE — 96365 THER/PROPH/DIAG IV INF INIT: CPT

## 2023-08-04 PROCEDURE — 85730 THROMBOPLASTIN TIME PARTIAL: CPT | Performed by: EMERGENCY MEDICINE

## 2023-08-04 PROCEDURE — 10002807 HB RX 258: Performed by: STUDENT IN AN ORGANIZED HEALTH CARE EDUCATION/TRAINING PROGRAM

## 2023-08-04 RX ORDER — AMOXICILLIN 250 MG
2 CAPSULE ORAL DAILY PRN
Status: DISCONTINUED | OUTPATIENT
Start: 2023-08-04 | End: 2023-08-12 | Stop reason: HOSPADM

## 2023-08-04 RX ORDER — OLMESARTAN MEDOXOMIL 20 MG/1
20 TABLET ORAL DAILY
Status: ON HOLD | COMMUNITY
Start: 2023-06-01 | End: 2023-08-12 | Stop reason: HOSPADM

## 2023-08-04 RX ORDER — HYDROCODONE BITARTRATE AND ACETAMINOPHEN 5; 325 MG/1; MG/1
1 TABLET ORAL EVERY 4 HOURS PRN
Status: DISCONTINUED | OUTPATIENT
Start: 2023-08-04 | End: 2023-08-11

## 2023-08-04 RX ORDER — CEFAZOLIN SODIUM/WATER 2 G/20 ML
2000 SYRINGE (ML) INTRAVENOUS ONCE
Status: DISCONTINUED | OUTPATIENT
Start: 2023-08-04 | End: 2023-08-04

## 2023-08-04 RX ORDER — ENOXAPARIN SODIUM 100 MG/ML
40 INJECTION SUBCUTANEOUS DAILY
Status: DISCONTINUED | OUTPATIENT
Start: 2023-08-05 | End: 2023-08-05

## 2023-08-04 RX ORDER — FAMOTIDINE 20 MG/1
20 TABLET, FILM COATED ORAL DAILY
Status: DISCONTINUED | OUTPATIENT
Start: 2023-08-05 | End: 2023-08-11

## 2023-08-04 RX ORDER — BETAMETHASONE VALERATE 1.2 MG/G
1 AEROSOL, FOAM TOPICAL 2 TIMES DAILY PRN
COMMUNITY

## 2023-08-04 RX ORDER — ATENOLOL 25 MG/1
12.5 TABLET ORAL DAILY
Status: ON HOLD | COMMUNITY
Start: 2023-06-01 | End: 2023-08-12 | Stop reason: HOSPADM

## 2023-08-04 RX ORDER — LANOLIN ALCOHOL/MO/W.PET/CERES
3 CREAM (GRAM) TOPICAL NIGHTLY
Status: DISCONTINUED | OUTPATIENT
Start: 2023-08-04 | End: 2023-08-11

## 2023-08-04 RX ORDER — KETOCONAZOLE 20 MG/ML
1 SHAMPOO TOPICAL
COMMUNITY

## 2023-08-04 RX ORDER — 0.9 % SODIUM CHLORIDE 0.9 %
2 VIAL (ML) INJECTION EVERY 12 HOURS SCHEDULED
Status: DISCONTINUED | OUTPATIENT
Start: 2023-08-04 | End: 2023-08-12 | Stop reason: HOSPADM

## 2023-08-04 RX ORDER — DESONIDE 0.5 MG/G
1 OINTMENT TOPICAL DAILY PRN
Status: ON HOLD | COMMUNITY
End: 2023-08-12 | Stop reason: HOSPADM

## 2023-08-04 RX ORDER — AZITHROMYCIN 250 MG/1
250 TABLET, FILM COATED ORAL
COMMUNITY
Start: 2023-07-27 | End: 2023-08-04

## 2023-08-04 RX ORDER — VALSARTAN 80 MG/1
80 TABLET ORAL DAILY
Status: DISCONTINUED | OUTPATIENT
Start: 2023-08-05 | End: 2023-08-12 | Stop reason: HOSPADM

## 2023-08-04 RX ORDER — POLYETHYLENE GLYCOL 3350 17 G/17G
17 POWDER, FOR SOLUTION ORAL DAILY PRN
Status: DISCONTINUED | OUTPATIENT
Start: 2023-08-04 | End: 2023-08-12 | Stop reason: HOSPADM

## 2023-08-04 RX ORDER — ASPIRIN 81 MG/1
81 TABLET ORAL DAILY
Status: DISCONTINUED | OUTPATIENT
Start: 2023-08-05 | End: 2023-08-11 | Stop reason: ALTCHOICE

## 2023-08-04 RX ORDER — ATENOLOL 25 MG/1
12.5 TABLET ORAL DAILY
Status: DISCONTINUED | OUTPATIENT
Start: 2023-08-05 | End: 2023-08-06

## 2023-08-04 RX ORDER — FAMOTIDINE 40 MG/1
40 TABLET, FILM COATED ORAL DAILY
Status: ON HOLD | COMMUNITY
Start: 2023-06-01 | End: 2023-08-12 | Stop reason: HOSPADM

## 2023-08-04 RX ORDER — ACETAMINOPHEN 325 MG/1
650 TABLET ORAL EVERY 4 HOURS PRN
Status: DISCONTINUED | OUTPATIENT
Start: 2023-08-04 | End: 2023-08-12 | Stop reason: HOSPADM

## 2023-08-04 RX ORDER — ASPIRIN 81 MG/1
81 TABLET ORAL DAILY
Status: ON HOLD | COMMUNITY
End: 2023-08-12 | Stop reason: HOSPADM

## 2023-08-04 RX ORDER — ONDANSETRON 2 MG/ML
4 INJECTION INTRAMUSCULAR; INTRAVENOUS 2 TIMES DAILY PRN
Status: DISCONTINUED | OUTPATIENT
Start: 2023-08-04 | End: 2023-08-12 | Stop reason: HOSPADM

## 2023-08-04 RX ORDER — FLUOCINOLONE ACETONIDE 0.11 MG/ML
1 OIL TOPICAL AT BEDTIME
Status: ON HOLD | COMMUNITY
Start: 2023-07-28 | End: 2023-08-12

## 2023-08-04 RX ORDER — CEFAZOLIN SODIUM/WATER 1 G/10 ML
1000 SYRINGE (ML) INTRAVENOUS ONCE
Status: COMPLETED | OUTPATIENT
Start: 2023-08-04 | End: 2023-08-04

## 2023-08-04 RX ADMIN — IOHEXOL 70 ML: 350 INJECTION, SOLUTION INTRAVENOUS at 12:40

## 2023-08-04 RX ADMIN — CEFTRIAXONE SODIUM 1000 MG: 10 INJECTION, POWDER, FOR SOLUTION INTRAVENOUS at 13:59

## 2023-08-04 RX ADMIN — AZITHROMYCIN 500 MG: 500 INJECTION, POWDER, LYOPHILIZED, FOR SOLUTION INTRAVENOUS at 14:00

## 2023-08-04 RX ADMIN — SODIUM CHLORIDE, POTASSIUM CHLORIDE, SODIUM LACTATE AND CALCIUM CHLORIDE 1000 ML: 600; 310; 30; 20 INJECTION, SOLUTION INTRAVENOUS at 11:05

## 2023-08-04 RX ADMIN — MAGNESIUM SULFATE HEPTAHYDRATE 2 G: 40 INJECTION, SOLUTION INTRAVENOUS at 11:07

## 2023-08-04 SDOH — ECONOMIC STABILITY: HOUSING INSECURITY: ARE YOU WORRIED ABOUT LOSING YOUR HOUSING?: NO

## 2023-08-04 SDOH — ECONOMIC STABILITY: HOUSING INSECURITY: WHAT IS YOUR LIVING SITUATION TODAY?: HOUSE

## 2023-08-04 SDOH — SOCIAL STABILITY: SOCIAL NETWORK
HOW OFTEN DO YOU SEE OR TALK TO PEOPLE THAT YOU CARE ABOUT AND FEEL CLOSE TO? (FOR EXAMPLE: TALKING TO FRIENDS ON THE PHONE, VISITING FRIENDS OR FAMILY, GOING TO CHURCH OR CLUB MEETINGS): 1 OR 2 TIMES A WEEK

## 2023-08-04 SDOH — SOCIAL STABILITY: SOCIAL NETWORK: SUPPORT SYSTEMS: CHURCH/FAITH COMMUNITY;FAMILY MEMBERS

## 2023-08-04 SDOH — HEALTH STABILITY: GENERAL
BECAUSE OF A PHYSICAL, MENTAL, OR EMOTIONAL CONDITION, DO YOU HAVE SERIOUS DIFFICULTY CONCENTRATING, REMEMBERING OR MAKING DECISIONS?: YES

## 2023-08-04 SDOH — HEALTH STABILITY: GENERAL: BECAUSE OF A PHYSICAL, MENTAL, OR EMOTIONAL CONDITION, DO YOU HAVE DIFFICULTY DOING ERRANDS ALONE?: YES

## 2023-08-04 SDOH — ECONOMIC STABILITY: FOOD INSECURITY: HOW OFTEN IN THE PAST 12 MONTHS WERE YOU WORRIED OR STRESSED ABOUT HAVING ENOUGH MONEY TO BUY NUTRITIOUS MEALS?: RARELY

## 2023-08-04 SDOH — ECONOMIC STABILITY: TRANSPORTATION INSECURITY
IN THE PAST 12 MONTHS, HAS THE LACK OF TRANSPORTATION KEPT YOU FROM MEDICAL APPOINTMENTS OR FROM GETTING MEDICATIONS?: NO

## 2023-08-04 SDOH — ECONOMIC STABILITY: HOUSING INSECURITY: WHAT IS YOUR LIVING SITUATION TODAY?: FAMILY MEMBERS

## 2023-08-04 SDOH — HEALTH STABILITY: PHYSICAL HEALTH: DO YOU HAVE SERIOUS DIFFICULTY WALKING OR CLIMBING STAIRS?: NO

## 2023-08-04 SDOH — ECONOMIC STABILITY: TRANSPORTATION INSECURITY
IN THE PAST 12 MONTHS, HAS LACK OF TRANSPORTATION KEPT YOU FROM MEETINGS, WORK, OR FROM GETTING THINGS NEEDED FOR DAILY LIVING?: NO

## 2023-08-04 SDOH — HEALTH STABILITY: PHYSICAL HEALTH: DO YOU HAVE DIFFICULTY DRESSING OR BATHING?: YES

## 2023-08-04 SDOH — ECONOMIC STABILITY: GENERAL

## 2023-08-04 ASSESSMENT — ACTIVITIES OF DAILY LIVING (ADL)
FEEDING: NEEDS ASSISTANCE
TOILETING: NEEDS ASSISTANCE
DRESSING: NEEDS ASSISTANCE
ADL_SCORE: 6
ADL_BEFORE_ADMISSION: NEEDS/REQUIRES ASSISTANCE
BATHING: NEEDS ASSISTANCE
RECENT_DECLINE_ADL: YES, DECLINE IN BATHING/DRESSING/FEEDING, COLLABORATE WITH PROVIDER (T);YES, DECLINE IN AMBULATION/TRANSFERRING, COLLABORATE WITH PROVIDER (T)
ADL_SHORT_OF_BREATH: NO

## 2023-08-04 ASSESSMENT — LIFESTYLE VARIABLES
HOW OFTEN DO YOU HAVE 6 OR MORE DRINKS ON ONE OCCASION: NEVER
AUDIT-C TOTAL SCORE: 0
HOW OFTEN DO YOU HAVE A DRINK CONTAINING ALCOHOL: NEVER
HOW MANY STANDARD DRINKS CONTAINING ALCOHOL DO YOU HAVE ON A TYPICAL DAY: 0,1 OR 2
ALCOHOL_USE_STATUS: NO OR LOW RISK WITH VALIDATED TOOL

## 2023-08-04 ASSESSMENT — COLUMBIA-SUICIDE SEVERITY RATING SCALE - C-SSRS: IS THE PATIENT ABLE TO COMPLETE C-SSRS: NO, DEFER TO LATER TIME

## 2023-08-04 ASSESSMENT — PAIN SCALES - GENERAL
PAINLEVEL_OUTOF10: 3
PAINLEVEL_OUTOF10: 0

## 2023-08-04 ASSESSMENT — PAIN SCALES - WONG BAKER: WONGBAKER_NUMERICALRESPONSE: 2

## 2023-08-04 ASSESSMENT — PATIENT HEALTH QUESTIONNAIRE - PHQ9: IS PATIENT ABLE TO COMPLETE PHQ2 OR PHQ9: NO, PATIENT WILL NEVER BE ABLE TO COMPLETE

## 2023-08-05 PROBLEM — E87.0 HYPERNATREMIA: Status: ACTIVE | Noted: 2023-08-05

## 2023-08-05 PROBLEM — R62.7 FAILURE TO THRIVE IN ADULT: Status: ACTIVE | Noted: 2023-08-05

## 2023-08-05 PROBLEM — G93.41 METABOLIC ENCEPHALOPATHY: Status: ACTIVE | Noted: 2023-08-05

## 2023-08-05 PROBLEM — E83.52 HYPERCALCEMIA: Status: ACTIVE | Noted: 2023-08-05

## 2023-08-05 PROBLEM — R79.89 ELEVATED TROPONIN: Status: ACTIVE | Noted: 2023-08-05

## 2023-08-05 PROBLEM — J69.0 ASPIRATION PNEUMONIA (CMD): Status: ACTIVE | Noted: 2023-08-05

## 2023-08-05 LAB
ALBUMIN SERPL-MCNC: 2.8 G/DL (ref 3.6–5.1)
ALBUMIN/GLOB SERPL: 0.8 {RATIO} (ref 1–2.4)
ALP SERPL-CCNC: 99 UNITS/L (ref 45–117)
ALT SERPL-CCNC: 76 UNITS/L
ANION GAP SERPL CALC-SCNC: 11 MMOL/L (ref 7–19)
ANION GAP SERPL CALC-SCNC: 9 MMOL/L (ref 7–19)
AST SERPL-CCNC: 71 UNITS/L
BASOPHILS # BLD: 0 K/MCL (ref 0–0.3)
BASOPHILS NFR BLD: 0 %
BILIRUB SERPL-MCNC: 1.2 MG/DL (ref 0.2–1)
BUN SERPL-MCNC: 29 MG/DL (ref 6–20)
BUN/CREAT SERPL: 33 (ref 7–25)
CALCIUM SERPL-MCNC: 11 MG/DL (ref 8.4–10.2)
CHLORIDE SERPL-SCNC: 121 MMOL/L (ref 97–110)
CHLORIDE SERPL-SCNC: 126 MMOL/L (ref 97–110)
CO2 SERPL-SCNC: 26 MMOL/L (ref 21–32)
CO2 SERPL-SCNC: 27 MMOL/L (ref 21–32)
CREAT SERPL-MCNC: 0.87 MG/DL (ref 0.51–0.95)
DEPRECATED RDW RBC: 45.1 FL (ref 39–50)
EOSINOPHIL # BLD: 0 K/MCL (ref 0–0.5)
EOSINOPHIL NFR BLD: 0 %
ERYTHROCYTE [DISTWIDTH] IN BLOOD: 14.2 % (ref 11–15)
FASTING DURATION TIME PATIENT: ABNORMAL H
GFR SERPLBLD BASED ON 1.73 SQ M-ARVRAT: 71 ML/MIN
GLOBULIN SER-MCNC: 3.7 G/DL (ref 2–4)
GLUCOSE SERPL-MCNC: 102 MG/DL (ref 70–99)
HCT VFR BLD CALC: 34.4 % (ref 36–46.5)
HGB BLD-MCNC: 10.9 G/DL (ref 12–15.5)
IMM GRANULOCYTES # BLD AUTO: 0.2 K/MCL (ref 0–0.2)
IMM GRANULOCYTES # BLD: 1 %
LYMPHOCYTES # BLD: 0.9 K/MCL (ref 1–4)
LYMPHOCYTES NFR BLD: 5 %
MCH RBC QN AUTO: 28.7 PG (ref 26–34)
MCHC RBC AUTO-ENTMCNC: 31.7 G/DL (ref 32–36.5)
MCV RBC AUTO: 90.5 FL (ref 78–100)
MONOCYTES # BLD: 1.1 K/MCL (ref 0.3–0.9)
MONOCYTES NFR BLD: 6 %
NEUTROPHILS # BLD: 16 K/MCL (ref 1.8–7.7)
NEUTROPHILS NFR BLD: 88 %
NRBC BLD MANUAL-RTO: 0 /100 WBC
PLATELET # BLD AUTO: 440 K/MCL (ref 140–450)
POTASSIUM SERPL-SCNC: 3 MMOL/L (ref 3.4–5.1)
POTASSIUM SERPL-SCNC: 3.1 MMOL/L (ref 3.4–5.1)
PROT SERPL-MCNC: 6.5 G/DL (ref 6.4–8.2)
RBC # BLD: 3.8 MIL/MCL (ref 4–5.2)
SODIUM SERPL-SCNC: 155 MMOL/L (ref 135–145)
SODIUM SERPL-SCNC: 159 MMOL/L (ref 135–145)
WBC # BLD: 18.2 K/MCL (ref 4.2–11)

## 2023-08-05 PROCEDURE — 84484 ASSAY OF TROPONIN QUANT: CPT | Performed by: INTERNAL MEDICINE

## 2023-08-05 PROCEDURE — 10000002 HB ROOM CHARGE MED SURG

## 2023-08-05 PROCEDURE — 92610 EVALUATE SWALLOWING FUNCTION: CPT

## 2023-08-05 PROCEDURE — 80053 COMPREHEN METABOLIC PANEL: CPT | Performed by: INTERNAL MEDICINE

## 2023-08-05 PROCEDURE — 96372 THER/PROPH/DIAG INJ SC/IM: CPT | Performed by: INTERNAL MEDICINE

## 2023-08-05 PROCEDURE — 85025 COMPLETE CBC W/AUTO DIFF WBC: CPT | Performed by: INTERNAL MEDICINE

## 2023-08-05 PROCEDURE — 80051 ELECTROLYTE PANEL: CPT | Performed by: INTERNAL MEDICINE

## 2023-08-05 PROCEDURE — 10002800 HB RX 250 W HCPCS: Performed by: INTERNAL MEDICINE

## 2023-08-05 PROCEDURE — 99223 1ST HOSP IP/OBS HIGH 75: CPT | Performed by: INTERNAL MEDICINE

## 2023-08-05 PROCEDURE — 10002807 HB RX 258: Performed by: INTERNAL MEDICINE

## 2023-08-05 PROCEDURE — 36415 COLL VENOUS BLD VENIPUNCTURE: CPT | Performed by: INTERNAL MEDICINE

## 2023-08-05 PROCEDURE — 10002800 HB RX 250 W HCPCS: Performed by: NURSE PRACTITIONER

## 2023-08-05 PROCEDURE — 10004651 HB RX, NO CHARGE ITEM: Performed by: INTERNAL MEDICINE

## 2023-08-05 RX ORDER — HYDRALAZINE HYDROCHLORIDE 20 MG/ML
10 INJECTION INTRAMUSCULAR; INTRAVENOUS EVERY 6 HOURS PRN
Status: DISCONTINUED | OUTPATIENT
Start: 2023-08-05 | End: 2023-08-12 | Stop reason: HOSPADM

## 2023-08-05 RX ORDER — POTASSIUM CHLORIDE 14.9 MG/ML
20 INJECTION INTRAVENOUS ONCE
Status: COMPLETED | OUTPATIENT
Start: 2023-08-05 | End: 2023-08-05

## 2023-08-05 RX ORDER — SODIUM CHLORIDE 9 MG/ML
INJECTION, SOLUTION INTRAVENOUS CONTINUOUS
Status: DISCONTINUED | OUTPATIENT
Start: 2023-08-05 | End: 2023-08-05

## 2023-08-05 RX ORDER — POTASSIUM CHLORIDE 14.9 MG/ML
20 INJECTION INTRAVENOUS ONCE
Status: COMPLETED | OUTPATIENT
Start: 2023-08-05 | End: 2023-08-06

## 2023-08-05 RX ORDER — ENOXAPARIN SODIUM 100 MG/ML
30 INJECTION SUBCUTANEOUS DAILY
Status: DISCONTINUED | OUTPATIENT
Start: 2023-08-06 | End: 2023-08-06

## 2023-08-05 RX ORDER — DEXTROSE MONOHYDRATE 50 MG/ML
INJECTION, SOLUTION INTRAVENOUS CONTINUOUS
Status: DISCONTINUED | OUTPATIENT
Start: 2023-08-05 | End: 2023-08-08

## 2023-08-05 RX ORDER — DROPERIDOL 2.5 MG/ML
5 INJECTION, SOLUTION INTRAMUSCULAR; INTRAVENOUS ONCE
Status: DISCONTINUED | OUTPATIENT
Start: 2023-08-05 | End: 2023-08-05

## 2023-08-05 RX ADMIN — HYDRALAZINE HYDROCHLORIDE 10 MG: 20 INJECTION INTRAMUSCULAR; INTRAVENOUS at 03:03

## 2023-08-05 RX ADMIN — POTASSIUM CHLORIDE 20 MEQ: 14.9 INJECTION, SOLUTION INTRAVENOUS at 20:14

## 2023-08-05 RX ADMIN — ENOXAPARIN SODIUM 40 MG: 40 INJECTION SUBCUTANEOUS at 12:20

## 2023-08-05 RX ADMIN — SODIUM CHLORIDE, PRESERVATIVE FREE 2 ML: 5 INJECTION INTRAVENOUS at 12:24

## 2023-08-05 RX ADMIN — CEFEPIME 1000 MG: 1 INJECTION, POWDER, FOR SOLUTION INTRAMUSCULAR; INTRAVENOUS at 22:26

## 2023-08-05 RX ADMIN — POTASSIUM CHLORIDE 20 MEQ: 14.9 INJECTION, SOLUTION INTRAVENOUS at 23:04

## 2023-08-05 RX ADMIN — SODIUM CHLORIDE, PRESERVATIVE FREE 2 ML: 5 INJECTION INTRAVENOUS at 01:11

## 2023-08-05 RX ADMIN — SODIUM CHLORIDE, PRESERVATIVE FREE 2 ML: 5 INJECTION INTRAVENOUS at 23:10

## 2023-08-05 RX ADMIN — SODIUM CHLORIDE: 9 INJECTION, SOLUTION INTRAVENOUS at 12:20

## 2023-08-05 ASSESSMENT — PAIN SCALES - WONG BAKER: WONGBAKER_NUMERICALRESPONSE: 0

## 2023-08-05 ASSESSMENT — COGNITIVE AND FUNCTIONAL STATUS - GENERAL
TAKING CARE OF COMPLICATED TASKS: UNABLE
APPLIED_COGNITIVE_CONVERTED_SCORE: 15.17
REMEMBERING 5 ERRANDS WITH NO LIST: UNABLE
APPLIED_COGNITIVE_RAW_SCORE: 7
FOLLOWS FAMILIAR CONVERSATION: A LOT
REMEMBERING TO TAKE MEDICATION: UNABLE
UNDERSTANDING 10 TO 15 MIN SPEECH: UNABLE
REMEMBERING WHERE THINGS ARE: UNABLE

## 2023-08-05 ASSESSMENT — PAIN SCALES - GENERAL: PAINLEVEL_OUTOF10: 0

## 2023-08-06 LAB
ALBUMIN SERPL-MCNC: 2.5 G/DL (ref 3.6–5.1)
ALBUMIN/GLOB SERPL: 0.8 {RATIO} (ref 1–2.4)
ALP SERPL-CCNC: 97 UNITS/L (ref 45–117)
ALT SERPL-CCNC: 74 UNITS/L
ANION GAP SERPL CALC-SCNC: 7 MMOL/L (ref 7–19)
ANION GAP SERPL CALC-SCNC: 8 MMOL/L (ref 7–19)
ANION GAP SERPL CALC-SCNC: 9 MMOL/L (ref 7–19)
APTT PPP: 29 SEC (ref 22–30)
APTT PPP: 37 SEC (ref 22–30)
APTT PPP: 54 SEC (ref 22–30)
AST SERPL-CCNC: 75 UNITS/L
ATRIAL RATE (BPM): 234
ATRIAL RATE (BPM): 78
BASOPHILS # BLD: 0 K/MCL (ref 0–0.3)
BASOPHILS NFR BLD: 0 %
BILIRUB SERPL-MCNC: 1 MG/DL (ref 0.2–1)
BUN SERPL-MCNC: 30 MG/DL (ref 6–20)
BUN/CREAT SERPL: 33 (ref 7–25)
CALCIUM SERPL-MCNC: 10.7 MG/DL (ref 8.4–10.2)
CHLORIDE SERPL-SCNC: 124 MMOL/L (ref 97–110)
CHLORIDE SERPL-SCNC: 125 MMOL/L (ref 97–110)
CHLORIDE SERPL-SCNC: 127 MMOL/L (ref 97–110)
CO2 SERPL-SCNC: 23 MMOL/L (ref 21–32)
CO2 SERPL-SCNC: 24 MMOL/L (ref 21–32)
CO2 SERPL-SCNC: 24 MMOL/L (ref 21–32)
CO2 SERPL-SCNC: 25 MMOL/L (ref 21–32)
CO2 SERPL-SCNC: 26 MMOL/L (ref 21–32)
CREAT SERPL-MCNC: 0.92 MG/DL (ref 0.51–0.95)
DEPRECATED RDW RBC: 47.4 FL (ref 39–50)
DEPRECATED RDW RBC: 48.6 FL (ref 39–50)
EOSINOPHIL # BLD: 0 K/MCL (ref 0–0.5)
EOSINOPHIL NFR BLD: 0 %
ERYTHROCYTE [DISTWIDTH] IN BLOOD: 14.3 % (ref 11–15)
ERYTHROCYTE [DISTWIDTH] IN BLOOD: 14.6 % (ref 11–15)
FASTING DURATION TIME PATIENT: ABNORMAL H
GFR SERPLBLD BASED ON 1.73 SQ M-ARVRAT: 67 ML/MIN
GLOBULIN SER-MCNC: 3.3 G/DL (ref 2–4)
GLUCOSE BLDC GLUCOMTR-MCNC: 94 MG/DL (ref 70–99)
GLUCOSE SERPL-MCNC: 133 MG/DL (ref 70–99)
HCT VFR BLD CALC: 33.2 % (ref 36–46.5)
HCT VFR BLD CALC: 34.4 % (ref 36–46.5)
HGB BLD-MCNC: 10.3 G/DL (ref 12–15.5)
HGB BLD-MCNC: 10.5 G/DL (ref 12–15.5)
IMM GRANULOCYTES # BLD AUTO: 0.1 K/MCL (ref 0–0.2)
IMM GRANULOCYTES # BLD: 1 %
INR PPP: 1.3
LYMPHOCYTES # BLD: 1.6 K/MCL (ref 1–4)
LYMPHOCYTES NFR BLD: 9 %
MCH RBC QN AUTO: 28.9 PG (ref 26–34)
MCH RBC QN AUTO: 28.9 PG (ref 26–34)
MCHC RBC AUTO-ENTMCNC: 30.5 G/DL (ref 32–36.5)
MCHC RBC AUTO-ENTMCNC: 31 G/DL (ref 32–36.5)
MCV RBC AUTO: 93.3 FL (ref 78–100)
MCV RBC AUTO: 94.8 FL (ref 78–100)
MONOCYTES # BLD: 1.1 K/MCL (ref 0.3–0.9)
MONOCYTES NFR BLD: 6 %
NEUTROPHILS # BLD: 14.5 K/MCL (ref 1.8–7.7)
NEUTROPHILS NFR BLD: 84 %
NRBC BLD MANUAL-RTO: 0 /100 WBC
NRBC BLD MANUAL-RTO: 0 /100 WBC
P AXIS (DEGREES): 55
PLATELET # BLD AUTO: 377 K/MCL (ref 140–450)
PLATELET # BLD AUTO: 395 K/MCL (ref 140–450)
POTASSIUM SERPL-SCNC: 3.4 MMOL/L (ref 3.4–5.1)
POTASSIUM SERPL-SCNC: 3.6 MMOL/L (ref 3.4–5.1)
POTASSIUM SERPL-SCNC: 3.9 MMOL/L (ref 3.4–5.1)
POTASSIUM SERPL-SCNC: 3.9 MMOL/L (ref 3.4–5.1)
PR-INTERVAL (MSEC): 128
PROT SERPL-MCNC: 5.8 G/DL (ref 6.4–8.2)
PROTHROMBIN TIME: 13.3 SEC (ref 9.7–11.8)
QRS-INTERVAL (MSEC): 66
QRS-INTERVAL (MSEC): 76
QT-INTERVAL (MSEC): 280
QT-INTERVAL (MSEC): 450
QTC: 479
QTC: 513
R AXIS (DEGREES): -60
R AXIS (DEGREES): -63
RBC # BLD: 3.56 MIL/MCL (ref 4–5.2)
RBC # BLD: 3.63 MIL/MCL (ref 4–5.2)
REPORT TEXT: NORMAL
REPORT TEXT: NORMAL
SODIUM SERPL-SCNC: 152 MMOL/L (ref 135–145)
SODIUM SERPL-SCNC: 153 MMOL/L (ref 135–145)
SODIUM SERPL-SCNC: 153 MMOL/L (ref 135–145)
SODIUM SERPL-SCNC: 154 MMOL/L (ref 135–145)
SODIUM SERPL-SCNC: 157 MMOL/L (ref 135–145)
T AXIS (DEGREES): -101
T AXIS (DEGREES): -106
TROPONIN I SERPL DL<=0.01 NG/ML-MCNC: 2796 NG/L
TROPONIN I SERPL DL<=0.01 NG/ML-MCNC: 2830 NG/L
TROPONIN I SERPL DL<=0.01 NG/ML-MCNC: 2884 NG/L
VENTRICULAR RATE EKG/MIN (BPM): 176
VENTRICULAR RATE EKG/MIN (BPM): 78
WBC # BLD: 16.2 K/MCL (ref 4.2–11)
WBC # BLD: 17.3 K/MCL (ref 4.2–11)

## 2023-08-06 PROCEDURE — 84484 ASSAY OF TROPONIN QUANT: CPT

## 2023-08-06 PROCEDURE — 10002800 HB RX 250 W HCPCS: Performed by: INTERNAL MEDICINE

## 2023-08-06 PROCEDURE — 85027 COMPLETE CBC AUTOMATED: CPT | Performed by: INTERNAL MEDICINE

## 2023-08-06 PROCEDURE — 85610 PROTHROMBIN TIME: CPT | Performed by: INTERNAL MEDICINE

## 2023-08-06 PROCEDURE — 93005 ELECTROCARDIOGRAM TRACING: CPT

## 2023-08-06 PROCEDURE — 93005 ELECTROCARDIOGRAM TRACING: CPT | Performed by: INTERNAL MEDICINE

## 2023-08-06 PROCEDURE — 80053 COMPREHEN METABOLIC PANEL: CPT | Performed by: INTERNAL MEDICINE

## 2023-08-06 PROCEDURE — 10002801 HB RX 250 W/O HCPCS

## 2023-08-06 PROCEDURE — 84132 ASSAY OF SERUM POTASSIUM: CPT | Performed by: INTERNAL MEDICINE

## 2023-08-06 PROCEDURE — 10004180 HB COUNTER-TRANSPORT

## 2023-08-06 PROCEDURE — 80051 ELECTROLYTE PANEL: CPT | Performed by: INTERNAL MEDICINE

## 2023-08-06 PROCEDURE — 36415 COLL VENOUS BLD VENIPUNCTURE: CPT | Performed by: INTERNAL MEDICINE

## 2023-08-06 PROCEDURE — 84484 ASSAY OF TROPONIN QUANT: CPT | Performed by: INTERNAL MEDICINE

## 2023-08-06 PROCEDURE — 10002807 HB RX 258: Performed by: INTERNAL MEDICINE

## 2023-08-06 PROCEDURE — 99291 CRITICAL CARE FIRST HOUR: CPT | Performed by: INTERNAL MEDICINE

## 2023-08-06 PROCEDURE — 85730 THROMBOPLASTIN TIME PARTIAL: CPT | Performed by: INTERNAL MEDICINE

## 2023-08-06 PROCEDURE — 99222 1ST HOSP IP/OBS MODERATE 55: CPT | Performed by: INTERNAL MEDICINE

## 2023-08-06 PROCEDURE — 10006031 HB ROOM CHARGE TELEMETRY

## 2023-08-06 PROCEDURE — 10004651 HB RX, NO CHARGE ITEM: Performed by: INTERNAL MEDICINE

## 2023-08-06 PROCEDURE — 85025 COMPLETE CBC W/AUTO DIFF WBC: CPT | Performed by: INTERNAL MEDICINE

## 2023-08-06 RX ORDER — HEPARIN SODIUM 1000 [USP'U]/ML
60 INJECTION, SOLUTION INTRAVENOUS; SUBCUTANEOUS PRN
Status: DISCONTINUED | OUTPATIENT
Start: 2023-08-06 | End: 2023-08-11

## 2023-08-06 RX ORDER — METOPROLOL TARTRATE 1 MG/ML
2.5 INJECTION, SOLUTION INTRAVENOUS ONCE
Status: COMPLETED | OUTPATIENT
Start: 2023-08-06 | End: 2023-08-06

## 2023-08-06 RX ORDER — HEPARIN SODIUM 10000 [USP'U]/100ML
1-30 INJECTION, SOLUTION INTRAVENOUS CONTINUOUS
Status: DISCONTINUED | OUTPATIENT
Start: 2023-08-06 | End: 2023-08-11

## 2023-08-06 RX ORDER — LACTULOSE 10 G/15ML
200 SOLUTION ORAL 4 TIMES DAILY
Status: DISCONTINUED | OUTPATIENT
Start: 2023-08-06 | End: 2023-08-11

## 2023-08-06 RX ORDER — HEPARIN SODIUM 1000 [USP'U]/ML
30 INJECTION, SOLUTION INTRAVENOUS; SUBCUTANEOUS PRN
Status: DISCONTINUED | OUTPATIENT
Start: 2023-08-06 | End: 2023-08-11

## 2023-08-06 RX ORDER — SODIUM CHLORIDE 450 MG/100ML
INJECTION, SOLUTION INTRAVENOUS CONTINUOUS
Status: DISCONTINUED | OUTPATIENT
Start: 2023-08-06 | End: 2023-08-07

## 2023-08-06 RX ORDER — LORAZEPAM 2 MG/ML
0.5 INJECTION INTRAMUSCULAR EVERY 4 HOURS PRN
Status: DISCONTINUED | OUTPATIENT
Start: 2023-08-06 | End: 2023-08-12 | Stop reason: HOSPADM

## 2023-08-06 RX ORDER — HEPARIN SODIUM 1000 [USP'U]/ML
60 INJECTION, SOLUTION INTRAVENOUS; SUBCUTANEOUS ONCE
Status: COMPLETED | OUTPATIENT
Start: 2023-08-06 | End: 2023-08-06

## 2023-08-06 RX ADMIN — SODIUM CHLORIDE: 4.5 INJECTION, SOLUTION INTRAVENOUS at 02:01

## 2023-08-06 RX ADMIN — AMIODARONE HYDROCHLORIDE 0.5 MG/MIN: 1.8 INJECTION, SOLUTION INTRAVENOUS at 21:40

## 2023-08-06 RX ADMIN — HEPARIN SODIUM AND DEXTROSE 14 UNITS/KG/HR: 10000; 5 INJECTION INTRAVENOUS at 21:56

## 2023-08-06 RX ADMIN — HEPARIN SODIUM 2600 UNITS: 1000 INJECTION, SOLUTION INTRAVENOUS; SUBCUTANEOUS at 10:10

## 2023-08-06 RX ADMIN — SODIUM CHLORIDE, PRESERVATIVE FREE 2 ML: 5 INJECTION INTRAVENOUS at 21:43

## 2023-08-06 RX ADMIN — METOPROLOL TARTRATE 2.5 MG: 1 INJECTION, SOLUTION INTRAVENOUS at 01:44

## 2023-08-06 RX ADMIN — CEFEPIME 1000 MG: 1 INJECTION, POWDER, FOR SOLUTION INTRAMUSCULAR; INTRAVENOUS at 22:05

## 2023-08-06 RX ADMIN — AMIODARONE HYDROCHLORIDE 0.5 MG/MIN: 1.8 INJECTION, SOLUTION INTRAVENOUS at 05:33

## 2023-08-06 RX ADMIN — SODIUM CHLORIDE, PRESERVATIVE FREE 2 ML: 5 INJECTION INTRAVENOUS at 11:21

## 2023-08-06 RX ADMIN — METOPROLOL TARTRATE 2.5 MG: 1 INJECTION, SOLUTION INTRAVENOUS at 02:31

## 2023-08-06 RX ADMIN — LORAZEPAM 0.5 MG: 2 INJECTION INTRAMUSCULAR; INTRAVENOUS at 22:41

## 2023-08-06 RX ADMIN — HEPARIN SODIUM AND DEXTROSE 12 UNITS/KG/HR: 10000; 5 INJECTION INTRAVENOUS at 10:16

## 2023-08-06 RX ADMIN — DEXTROSE MONOHYDRATE: 50 INJECTION, SOLUTION INTRAVENOUS at 00:04

## 2023-08-06 RX ADMIN — LORAZEPAM 0.5 MG: 2 INJECTION INTRAMUSCULAR; INTRAVENOUS at 12:23

## 2023-08-06 RX ADMIN — AMIODARONE HYDROCHLORIDE 150 MG: 1.5 INJECTION, SOLUTION INTRAVENOUS at 03:27

## 2023-08-06 RX ADMIN — MORPHINE SULFATE 2 MG: 2 INJECTION, SOLUTION INTRAMUSCULAR; INTRAVENOUS at 12:23

## 2023-08-06 RX ADMIN — CEFEPIME 1000 MG: 1 INJECTION, POWDER, FOR SOLUTION INTRAMUSCULAR; INTRAVENOUS at 10:22

## 2023-08-06 ASSESSMENT — PAIN SCALES - WONG BAKER
WONGBAKER_NUMERICALRESPONSE: 0

## 2023-08-07 ENCOUNTER — APPOINTMENT (OUTPATIENT)
Dept: CARDIOLOGY | Age: 73
DRG: 871 | End: 2023-08-07
Attending: INTERNAL MEDICINE

## 2023-08-07 LAB
AORTIC VALVE AREA (AVA): 0.53
APTT PPP: 49 SEC (ref 22–30)
APTT PPP: 51 SEC (ref 22–30)
AV STENOSIS SEVERITY TEXT: NORMAL
AVI LVOT PEAK GRADIENT (LVOTMG): 0.8
DEPRECATED RDW RBC: 48 FL (ref 39–50)
E WAVE DECELARATION TIME (MDT): 7.88
ERYTHROCYTE [DISTWIDTH] IN BLOOD: 14.5 % (ref 11–15)
HCT VFR BLD CALC: 32.4 % (ref 36–46.5)
HGB BLD-MCNC: 9.9 G/DL (ref 12–15.5)
INTERVENTRICULAR SEPTUM IN END DIASTOLE (IVSD): 0.9
LEFT INTERNAL DIMENSION IN SYSTOLE (LVSD): 1
LEFT VENTRICULAR INTERNAL DIMENSION IN DIASTOLE (LVDD): 3.2
LEFT VENTRICULAR POSTERIOR WALL IN END DIASTOLE (LVPW): 4
LV EF: NORMAL %
MCH RBC QN AUTO: 28.9 PG (ref 26–34)
MCHC RBC AUTO-ENTMCNC: 30.6 G/DL (ref 32–36.5)
MCV RBC AUTO: 94.7 FL (ref 78–100)
MV E TISSUE VEL MED (MESV): 12.4
MV E WAVE VEL/E TISSUE VEL MED(MSR): 6.74
MV PEAK A VELOCITY (MVPAV): 148
MV PEAK E VELOCITY (MVPEV): 0.61
NRBC BLD MANUAL-RTO: 0 /100 WBC
PLATELET # BLD AUTO: 316 K/MCL (ref 140–450)
POTASSIUM SERPL-SCNC: 4.1 MMOL/L (ref 3.4–5.1)
RBC # BLD: 3.42 MIL/MCL (ref 4–5.2)
TRICUSPID VALVE PEAK REGURGITATION VELOCITY (TRPV): 2.8
TSH SERPL-ACNC: 3.36 MCUNITS/ML (ref 0.35–5)
TSH SERPL-ACNC: 3.91 MCUNITS/ML (ref 0.35–5)
TV ESTIMATED RIGHT ARTERIAL PRESSURE (RAP): 12.9
WBC # BLD: 12.2 K/MCL (ref 4.2–11)

## 2023-08-07 PROCEDURE — 10006031 HB ROOM CHARGE TELEMETRY

## 2023-08-07 PROCEDURE — 36415 COLL VENOUS BLD VENIPUNCTURE: CPT | Performed by: INTERNAL MEDICINE

## 2023-08-07 PROCEDURE — 10002807 HB RX 258: Performed by: INTERNAL MEDICINE

## 2023-08-07 PROCEDURE — 93306 TTE W/DOPPLER COMPLETE: CPT

## 2023-08-07 PROCEDURE — 10002800 HB RX 250 W HCPCS: Performed by: INTERNAL MEDICINE

## 2023-08-07 PROCEDURE — 84132 ASSAY OF SERUM POTASSIUM: CPT | Performed by: INTERNAL MEDICINE

## 2023-08-07 PROCEDURE — 85027 COMPLETE CBC AUTOMATED: CPT | Performed by: INTERNAL MEDICINE

## 2023-08-07 PROCEDURE — 10004651 HB RX, NO CHARGE ITEM: Performed by: INTERNAL MEDICINE

## 2023-08-07 PROCEDURE — 99232 SBSQ HOSP IP/OBS MODERATE 35: CPT | Performed by: INTERNAL MEDICINE

## 2023-08-07 PROCEDURE — 84443 ASSAY THYROID STIM HORMONE: CPT | Performed by: INTERNAL MEDICINE

## 2023-08-07 PROCEDURE — 85730 THROMBOPLASTIN TIME PARTIAL: CPT | Performed by: INTERNAL MEDICINE

## 2023-08-07 PROCEDURE — 99291 CRITICAL CARE FIRST HOUR: CPT | Performed by: INTERNAL MEDICINE

## 2023-08-07 PROCEDURE — 10002801 HB RX 250 W/O HCPCS: Performed by: INTERNAL MEDICINE

## 2023-08-07 RX ORDER — DIGOXIN 0.25 MG/ML
250 INJECTION INTRAMUSCULAR; INTRAVENOUS ONCE
Status: COMPLETED | OUTPATIENT
Start: 2023-08-07 | End: 2023-08-07

## 2023-08-07 RX ORDER — POTASSIUM CHLORIDE 14.9 MG/ML
20 INJECTION INTRAVENOUS ONCE
Status: COMPLETED | OUTPATIENT
Start: 2023-08-07 | End: 2023-08-07

## 2023-08-07 RX ADMIN — CEFEPIME 1000 MG: 1 INJECTION, POWDER, FOR SOLUTION INTRAMUSCULAR; INTRAVENOUS at 09:05

## 2023-08-07 RX ADMIN — SODIUM CHLORIDE: 4.5 INJECTION, SOLUTION INTRAVENOUS at 06:11

## 2023-08-07 RX ADMIN — LORAZEPAM 0.5 MG: 2 INJECTION INTRAMUSCULAR; INTRAVENOUS at 18:22

## 2023-08-07 RX ADMIN — LORAZEPAM 0.5 MG: 2 INJECTION INTRAMUSCULAR; INTRAVENOUS at 06:11

## 2023-08-07 RX ADMIN — SODIUM CHLORIDE, PRESERVATIVE FREE 2 ML: 5 INJECTION INTRAVENOUS at 09:03

## 2023-08-07 RX ADMIN — DIGOXIN 250 MCG: 0.25 INJECTION INTRAMUSCULAR; INTRAVENOUS at 13:42

## 2023-08-07 RX ADMIN — LORAZEPAM 0.5 MG: 2 INJECTION INTRAMUSCULAR; INTRAVENOUS at 12:31

## 2023-08-07 RX ADMIN — LORAZEPAM 0.5 MG: 2 INJECTION INTRAMUSCULAR; INTRAVENOUS at 02:49

## 2023-08-07 RX ADMIN — POTASSIUM CHLORIDE 20 MEQ: 14.9 INJECTION, SOLUTION INTRAVENOUS at 10:26

## 2023-08-07 RX ADMIN — POTASSIUM CHLORIDE 20 MEQ: 14.9 INJECTION, SOLUTION INTRAVENOUS at 12:34

## 2023-08-07 RX ADMIN — CEFEPIME 1000 MG: 1 INJECTION, POWDER, FOR SOLUTION INTRAMUSCULAR; INTRAVENOUS at 23:58

## 2023-08-07 ASSESSMENT — PAIN SCALES - WONG BAKER
WONGBAKER_NUMERICALRESPONSE: 0

## 2023-08-07 ASSESSMENT — PAIN SCALES - GENERAL: PAINLEVEL_OUTOF10: 0

## 2023-08-08 LAB
ANION GAP SERPL CALC-SCNC: 9 MMOL/L (ref 7–19)
APTT PPP: 58 SEC (ref 22–30)
BUN SERPL-MCNC: 15 MG/DL (ref 6–20)
BUN/CREAT SERPL: 21 (ref 7–25)
CALCIUM SERPL-MCNC: 9.6 MG/DL (ref 8.4–10.2)
CHLORIDE SERPL-SCNC: 114 MMOL/L (ref 97–110)
CO2 SERPL-SCNC: 22 MMOL/L (ref 21–32)
CREAT SERPL-MCNC: 0.72 MG/DL (ref 0.51–0.95)
DEPRECATED RDW RBC: 47.8 FL (ref 39–50)
ERYTHROCYTE [DISTWIDTH] IN BLOOD: 14.4 % (ref 11–15)
FASTING DURATION TIME PATIENT: ABNORMAL H
GFR SERPLBLD BASED ON 1.73 SQ M-ARVRAT: 89 ML/MIN
GLUCOSE SERPL-MCNC: 131 MG/DL (ref 70–99)
HCT VFR BLD CALC: 36.1 % (ref 36–46.5)
HGB BLD-MCNC: 11 G/DL (ref 12–15.5)
MCH RBC QN AUTO: 28.5 PG (ref 26–34)
MCHC RBC AUTO-ENTMCNC: 30.5 G/DL (ref 32–36.5)
MCV RBC AUTO: 93.5 FL (ref 78–100)
NRBC BLD MANUAL-RTO: 0 /100 WBC
PLATELET # BLD AUTO: 302 K/MCL (ref 140–450)
POTASSIUM SERPL-SCNC: 3.3 MMOL/L (ref 3.4–5.1)
POTASSIUM SERPL-SCNC: 3.7 MMOL/L (ref 3.4–5.1)
RBC # BLD: 3.86 MIL/MCL (ref 4–5.2)
SODIUM SERPL-SCNC: 142 MMOL/L (ref 135–145)
WBC # BLD: 12.4 K/MCL (ref 4.2–11)

## 2023-08-08 PROCEDURE — 36415 COLL VENOUS BLD VENIPUNCTURE: CPT | Performed by: INTERNAL MEDICINE

## 2023-08-08 PROCEDURE — 80048 BASIC METABOLIC PNL TOTAL CA: CPT | Performed by: INTERNAL MEDICINE

## 2023-08-08 PROCEDURE — 10006031 HB ROOM CHARGE TELEMETRY

## 2023-08-08 PROCEDURE — 85730 THROMBOPLASTIN TIME PARTIAL: CPT | Performed by: INTERNAL MEDICINE

## 2023-08-08 PROCEDURE — 10004651 HB RX, NO CHARGE ITEM: Performed by: INTERNAL MEDICINE

## 2023-08-08 PROCEDURE — 13003289 HB OXYGEN THERAPY DAILY

## 2023-08-08 PROCEDURE — 10002800 HB RX 250 W HCPCS: Performed by: INTERNAL MEDICINE

## 2023-08-08 PROCEDURE — 84132 ASSAY OF SERUM POTASSIUM: CPT | Performed by: INTERNAL MEDICINE

## 2023-08-08 PROCEDURE — 99232 SBSQ HOSP IP/OBS MODERATE 35: CPT | Performed by: INTERNAL MEDICINE

## 2023-08-08 PROCEDURE — 99291 CRITICAL CARE FIRST HOUR: CPT | Performed by: INTERNAL MEDICINE

## 2023-08-08 PROCEDURE — 10002807 HB RX 258: Performed by: INTERNAL MEDICINE

## 2023-08-08 PROCEDURE — 85027 COMPLETE CBC AUTOMATED: CPT | Performed by: INTERNAL MEDICINE

## 2023-08-08 RX ORDER — POTASSIUM CHLORIDE 14.9 MG/ML
20 INJECTION INTRAVENOUS ONCE
Status: COMPLETED | OUTPATIENT
Start: 2023-08-08 | End: 2023-08-08

## 2023-08-08 RX ORDER — POTASSIUM CHLORIDE 1.5 G/1.58G
40 POWDER, FOR SOLUTION ORAL ONCE
Status: DISCONTINUED | OUTPATIENT
Start: 2023-08-08 | End: 2023-08-12 | Stop reason: HOSPADM

## 2023-08-08 RX ORDER — DIGOXIN 0.25 MG/ML
250 INJECTION INTRAMUSCULAR; INTRAVENOUS ONCE
Status: COMPLETED | OUTPATIENT
Start: 2023-08-08 | End: 2023-08-09

## 2023-08-08 RX ORDER — SODIUM CHLORIDE 9 MG/ML
INJECTION, SOLUTION INTRAVENOUS CONTINUOUS
Status: DISCONTINUED | OUTPATIENT
Start: 2023-08-08 | End: 2023-08-08

## 2023-08-08 RX ORDER — DEXTROSE AND SODIUM CHLORIDE 5; .9 G/100ML; G/100ML
INJECTION, SOLUTION INTRAVENOUS CONTINUOUS
Status: DISCONTINUED | OUTPATIENT
Start: 2023-08-08 | End: 2023-08-12 | Stop reason: HOSPADM

## 2023-08-08 RX ADMIN — POTASSIUM CHLORIDE 20 MEQ: 14.9 INJECTION, SOLUTION INTRAVENOUS at 12:00

## 2023-08-08 RX ADMIN — SODIUM CHLORIDE, PRESERVATIVE FREE 2 ML: 5 INJECTION INTRAVENOUS at 21:36

## 2023-08-08 RX ADMIN — CEFEPIME 1000 MG: 1 INJECTION, POWDER, FOR SOLUTION INTRAMUSCULAR; INTRAVENOUS at 09:13

## 2023-08-08 RX ADMIN — DEXTROSE MONOHYDRATE: 50 INJECTION, SOLUTION INTRAVENOUS at 01:23

## 2023-08-08 RX ADMIN — POTASSIUM CHLORIDE 20 MEQ: 14.9 INJECTION, SOLUTION INTRAVENOUS at 10:05

## 2023-08-08 RX ADMIN — CEFEPIME 1000 MG: 1 INJECTION, POWDER, FOR SOLUTION INTRAMUSCULAR; INTRAVENOUS at 21:36

## 2023-08-08 RX ADMIN — DEXTROSE AND SODIUM CHLORIDE: 5; 900 INJECTION, SOLUTION INTRAVENOUS at 12:08

## 2023-08-08 RX ADMIN — SODIUM CHLORIDE, PRESERVATIVE FREE 2 ML: 5 INJECTION INTRAVENOUS at 01:03

## 2023-08-08 RX ADMIN — HEPARIN SODIUM AND DEXTROSE 14 UNITS/KG/HR: 10000; 5 INJECTION INTRAVENOUS at 01:30

## 2023-08-08 RX ADMIN — SODIUM CHLORIDE, PRESERVATIVE FREE 2 ML: 5 INJECTION INTRAVENOUS at 09:15

## 2023-08-08 ASSESSMENT — PAIN SCALES - WONG BAKER
WONGBAKER_NUMERICALRESPONSE: 0

## 2023-08-08 ASSESSMENT — PAIN SCALES - GENERAL
PAINLEVEL_OUTOF10: 0
PAINLEVEL_OUTOF10: 0

## 2023-08-09 LAB
ANION GAP SERPL CALC-SCNC: 8 MMOL/L (ref 7–19)
APTT PPP: 54 SEC (ref 22–30)
BACTERIA BLD CULT: NORMAL
BACTERIA BLD CULT: NORMAL
BUN SERPL-MCNC: 8 MG/DL (ref 6–20)
BUN/CREAT SERPL: 15 (ref 7–25)
CALCIUM SERPL-MCNC: 9.5 MG/DL (ref 8.4–10.2)
CHLORIDE SERPL-SCNC: 113 MMOL/L (ref 97–110)
CO2 SERPL-SCNC: 24 MMOL/L (ref 21–32)
CREAT SERPL-MCNC: 0.54 MG/DL (ref 0.51–0.95)
DEPRECATED RDW RBC: 45.4 FL (ref 39–50)
ERYTHROCYTE [DISTWIDTH] IN BLOOD: 14.4 % (ref 11–15)
FASTING DURATION TIME PATIENT: ABNORMAL H
GFR SERPLBLD BASED ON 1.73 SQ M-ARVRAT: >90 ML/MIN
GLUCOSE SERPL-MCNC: 107 MG/DL (ref 70–99)
HCT VFR BLD CALC: 31.8 % (ref 36–46.5)
HGB BLD-MCNC: 10.1 G/DL (ref 12–15.5)
MCH RBC QN AUTO: 28.8 PG (ref 26–34)
MCHC RBC AUTO-ENTMCNC: 31.8 G/DL (ref 32–36.5)
MCV RBC AUTO: 90.6 FL (ref 78–100)
NRBC BLD MANUAL-RTO: 0 /100 WBC
PLATELET # BLD AUTO: 277 K/MCL (ref 140–450)
POTASSIUM SERPL-SCNC: 3.3 MMOL/L (ref 3.4–5.1)
RBC # BLD: 3.51 MIL/MCL (ref 4–5.2)
SODIUM SERPL-SCNC: 142 MMOL/L (ref 135–145)
WBC # BLD: 11.7 K/MCL (ref 4.2–11)

## 2023-08-09 PROCEDURE — 10002800 HB RX 250 W HCPCS: Performed by: INTERNAL MEDICINE

## 2023-08-09 PROCEDURE — 99232 SBSQ HOSP IP/OBS MODERATE 35: CPT | Performed by: INTERNAL MEDICINE

## 2023-08-09 PROCEDURE — 10002807 HB RX 258: Performed by: INTERNAL MEDICINE

## 2023-08-09 PROCEDURE — 80048 BASIC METABOLIC PNL TOTAL CA: CPT | Performed by: INTERNAL MEDICINE

## 2023-08-09 PROCEDURE — 10004651 HB RX, NO CHARGE ITEM: Performed by: INTERNAL MEDICINE

## 2023-08-09 PROCEDURE — 10006031 HB ROOM CHARGE TELEMETRY

## 2023-08-09 PROCEDURE — 85730 THROMBOPLASTIN TIME PARTIAL: CPT | Performed by: INTERNAL MEDICINE

## 2023-08-09 PROCEDURE — 99233 SBSQ HOSP IP/OBS HIGH 50: CPT | Performed by: STUDENT IN AN ORGANIZED HEALTH CARE EDUCATION/TRAINING PROGRAM

## 2023-08-09 PROCEDURE — 36415 COLL VENOUS BLD VENIPUNCTURE: CPT | Performed by: INTERNAL MEDICINE

## 2023-08-09 PROCEDURE — 85027 COMPLETE CBC AUTOMATED: CPT | Performed by: INTERNAL MEDICINE

## 2023-08-09 RX ORDER — DIGOXIN 0.25 MG/ML
250 INJECTION INTRAMUSCULAR; INTRAVENOUS ONCE
Status: COMPLETED | OUTPATIENT
Start: 2023-08-09 | End: 2023-08-09

## 2023-08-09 RX ADMIN — DEXTROSE AND SODIUM CHLORIDE: 5; 900 INJECTION, SOLUTION INTRAVENOUS at 15:50

## 2023-08-09 RX ADMIN — LORAZEPAM 0.5 MG: 2 INJECTION INTRAMUSCULAR; INTRAVENOUS at 09:25

## 2023-08-09 RX ADMIN — DIGOXIN 250 MCG: 0.25 INJECTION INTRAMUSCULAR; INTRAVENOUS at 00:47

## 2023-08-09 RX ADMIN — CEFEPIME 1000 MG: 1 INJECTION, POWDER, FOR SOLUTION INTRAMUSCULAR; INTRAVENOUS at 09:28

## 2023-08-09 RX ADMIN — DIGOXIN 250 MCG: 0.25 INJECTION INTRAMUSCULAR; INTRAVENOUS at 12:15

## 2023-08-09 RX ADMIN — SODIUM CHLORIDE, PRESERVATIVE FREE 2 ML: 5 INJECTION INTRAVENOUS at 10:55

## 2023-08-09 RX ADMIN — DEXTROSE AND SODIUM CHLORIDE: 5; 900 INJECTION, SOLUTION INTRAVENOUS at 00:31

## 2023-08-09 RX ADMIN — HEPARIN SODIUM AND DEXTROSE 14 UNITS/KG/HR: 10000; 5 INJECTION INTRAVENOUS at 21:14

## 2023-08-09 RX ADMIN — SODIUM CHLORIDE, PRESERVATIVE FREE 2 ML: 5 INJECTION INTRAVENOUS at 21:14

## 2023-08-09 RX ADMIN — CEFEPIME 1000 MG: 1 INJECTION, POWDER, FOR SOLUTION INTRAMUSCULAR; INTRAVENOUS at 17:47

## 2023-08-09 RX ADMIN — MORPHINE SULFATE 2 MG: 2 INJECTION, SOLUTION INTRAMUSCULAR; INTRAVENOUS at 15:47

## 2023-08-09 ASSESSMENT — PAIN SCALES - PAIN ASSESSMENT IN ADVANCED DEMENTIA (PAINAD)
BREATHING: NORMAL
TOTALSCORE: 0
BODYLANGUAGE: RIGID, FISTS CLINCHED, KNEES PULLED UP. PUSHING OR PULLING AWAY, STRIKES OUT
TOTALSCORE: 6
BREATHING: NORMAL
FACIALEXPRESSION: SMILING OR INEXPRESSIVE
CONSOLABILITY: UNABLE TO CONSOLE, DISTRACT OR REASSURE
CONSOLABILITY: NO NEED TO CONSOLE
FACIALEXPRESSION: FACIAL GRIMACING
BODYLANGUAGE: RELAXED

## 2023-08-09 ASSESSMENT — PAIN SCALES - WONG BAKER
WONGBAKER_NUMERICALRESPONSE: 0

## 2023-08-10 ENCOUNTER — TELEPHONE ENCOUNTER (OUTPATIENT)
Dept: URBAN - NONMETROPOLITAN AREA CLINIC 2 | Facility: CLINIC | Age: 73
End: 2023-08-10

## 2023-08-10 LAB
ANION GAP SERPL CALC-SCNC: 9 MMOL/L (ref 7–19)
APTT PPP: 52 SEC (ref 22–30)
BUN SERPL-MCNC: 6 MG/DL (ref 6–20)
BUN/CREAT SERPL: 10 (ref 7–25)
CALCIUM SERPL-MCNC: 8.9 MG/DL (ref 8.4–10.2)
CHLORIDE SERPL-SCNC: 113 MMOL/L (ref 97–110)
CO2 SERPL-SCNC: 23 MMOL/L (ref 21–32)
CREAT SERPL-MCNC: 0.63 MG/DL (ref 0.51–0.95)
DEPRECATED RDW RBC: 46.5 FL (ref 39–50)
ERYTHROCYTE [DISTWIDTH] IN BLOOD: 14.4 % (ref 11–15)
FASTING DURATION TIME PATIENT: ABNORMAL H
GFR SERPLBLD BASED ON 1.73 SQ M-ARVRAT: >90 ML/MIN
GLUCOSE SERPL-MCNC: 103 MG/DL (ref 70–99)
HCT VFR BLD CALC: 31.5 % (ref 36–46.5)
HGB BLD-MCNC: 9.9 G/DL (ref 12–15.5)
MCH RBC QN AUTO: 28.6 PG (ref 26–34)
MCHC RBC AUTO-ENTMCNC: 31.4 G/DL (ref 32–36.5)
MCV RBC AUTO: 91 FL (ref 78–100)
NRBC BLD MANUAL-RTO: 0 /100 WBC
PLATELET # BLD AUTO: 284 K/MCL (ref 140–450)
POTASSIUM SERPL-SCNC: 2.7 MMOL/L (ref 3.4–5.1)
RBC # BLD: 3.46 MIL/MCL (ref 4–5.2)
SODIUM SERPL-SCNC: 142 MMOL/L (ref 135–145)
WBC # BLD: 10.9 K/MCL (ref 4.2–11)

## 2023-08-10 PROCEDURE — 99232 SBSQ HOSP IP/OBS MODERATE 35: CPT | Performed by: INTERNAL MEDICINE

## 2023-08-10 PROCEDURE — 10002807 HB RX 258: Performed by: INTERNAL MEDICINE

## 2023-08-10 PROCEDURE — 85730 THROMBOPLASTIN TIME PARTIAL: CPT | Performed by: INTERNAL MEDICINE

## 2023-08-10 PROCEDURE — 10006031 HB ROOM CHARGE TELEMETRY

## 2023-08-10 PROCEDURE — 36415 COLL VENOUS BLD VENIPUNCTURE: CPT | Performed by: INTERNAL MEDICINE

## 2023-08-10 PROCEDURE — 10002800 HB RX 250 W HCPCS: Performed by: INTERNAL MEDICINE

## 2023-08-10 PROCEDURE — 99233 SBSQ HOSP IP/OBS HIGH 50: CPT | Performed by: STUDENT IN AN ORGANIZED HEALTH CARE EDUCATION/TRAINING PROGRAM

## 2023-08-10 PROCEDURE — 10004651 HB RX, NO CHARGE ITEM: Performed by: INTERNAL MEDICINE

## 2023-08-10 PROCEDURE — 10002800 HB RX 250 W HCPCS: Performed by: STUDENT IN AN ORGANIZED HEALTH CARE EDUCATION/TRAINING PROGRAM

## 2023-08-10 PROCEDURE — 80048 BASIC METABOLIC PNL TOTAL CA: CPT | Performed by: INTERNAL MEDICINE

## 2023-08-10 PROCEDURE — 85027 COMPLETE CBC AUTOMATED: CPT | Performed by: INTERNAL MEDICINE

## 2023-08-10 RX ORDER — METRONIDAZOLE 500 MG/1
1 TABLET TABLET, FILM COATED ORAL THREE TIMES A DAY
Qty: 42 TABLET | Refills: 0
Start: 2023-01-12 | End: 2023-08-24

## 2023-08-10 RX ORDER — POTASSIUM CHLORIDE 14.9 MG/ML
20 INJECTION INTRAVENOUS ONCE
Status: COMPLETED | OUTPATIENT
Start: 2023-08-10 | End: 2023-08-10

## 2023-08-10 RX ORDER — FLUCONAZOLE 150 MG/1
1 TABLET TABLET ORAL
Qty: 2 | Refills: 1
Start: 2023-01-12 | End: 2023-08-16

## 2023-08-10 RX ADMIN — MORPHINE SULFATE 2 MG: 2 INJECTION, SOLUTION INTRAMUSCULAR; INTRAVENOUS at 21:33

## 2023-08-10 RX ADMIN — CEFEPIME 1000 MG: 1 INJECTION, POWDER, FOR SOLUTION INTRAMUSCULAR; INTRAVENOUS at 09:28

## 2023-08-10 RX ADMIN — MORPHINE SULFATE 2 MG: 2 INJECTION, SOLUTION INTRAMUSCULAR; INTRAVENOUS at 10:55

## 2023-08-10 RX ADMIN — POTASSIUM CHLORIDE 20 MEQ: 14.9 INJECTION, SOLUTION INTRAVENOUS at 12:36

## 2023-08-10 RX ADMIN — POTASSIUM CHLORIDE 20 MEQ: 14.9 INJECTION, SOLUTION INTRAVENOUS at 14:50

## 2023-08-10 RX ADMIN — CEFEPIME 1000 MG: 1 INJECTION, POWDER, FOR SOLUTION INTRAMUSCULAR; INTRAVENOUS at 01:05

## 2023-08-10 RX ADMIN — SODIUM CHLORIDE, PRESERVATIVE FREE 2 ML: 5 INJECTION INTRAVENOUS at 21:33

## 2023-08-10 RX ADMIN — LORAZEPAM 0.5 MG: 2 INJECTION INTRAMUSCULAR; INTRAVENOUS at 12:31

## 2023-08-10 RX ADMIN — CEFEPIME 1000 MG: 1 INJECTION, POWDER, FOR SOLUTION INTRAMUSCULAR; INTRAVENOUS at 18:28

## 2023-08-10 RX ADMIN — DEXTROSE AND SODIUM CHLORIDE: 5; 900 INJECTION, SOLUTION INTRAVENOUS at 05:00

## 2023-08-10 RX ADMIN — SODIUM CHLORIDE, PRESERVATIVE FREE 2 ML: 5 INJECTION INTRAVENOUS at 11:18

## 2023-08-10 ASSESSMENT — PAIN SCALES - PAIN ASSESSMENT IN ADVANCED DEMENTIA (PAINAD)
CONSOLABILITY: NO NEED TO CONSOLE
BODYLANGUAGE: RELAXED
FACIALEXPRESSION: SMILING OR INEXPRESSIVE
CONSOLABILITY: NO NEED TO CONSOLE
CONSOLABILITY: NO NEED TO CONSOLE
TOTALSCORE: 2
BREATHING: NORMAL
BODYLANGUAGE: TENSE, DISTRESSED, FIDGETING
FACIALEXPRESSION: SAD. FRIGHTENED. FROWNING
BODYLANGUAGE: RIGID, FISTS CLINCHED, KNEES PULLED UP. PUSHING OR PULLING AWAY, STRIKES OUT
TOTALSCORE: 0
CONSOLABILITY: NO NEED TO CONSOLE
BREATHING: OCCASIONAL LABORED BREATHING, SHORT PERIOD OF HYPERVENTILATION
BREATHING: NORMAL
BODYLANGUAGE: RIGID, FISTS CLINCHED, KNEES PULLED UP. PUSHING OR PULLING AWAY, STRIKES OUT
FACIALEXPRESSION: SMILING OR INEXPRESSIVE
BREATHING: NORMAL
FACIALEXPRESSION: SMILING OR INEXPRESSIVE
TOTALSCORE: 0
TOTALSCORE: 7
FACIALEXPRESSION: SMILING OR INEXPRESSIVE
CONSOLABILITY: NO NEED TO CONSOLE
CONSOLABILITY: UNABLE TO CONSOLE, DISTRACT OR REASSURE
TOTALSCORE: 0
FACIALEXPRESSION: SMILING OR INEXPRESSIVE
BODYLANGUAGE: RELAXED
NEGVOCALIZATION: OCCASIONAL MOAN OR GROAN, LOW LEVELS OF SPEECH WITH A NEGATIVE OR DISAPPROVING QUALITY
CONSOLABILITY: NO NEED TO CONSOLE
BREATHING: NORMAL
TOTALSCORE: 0
BODYLANGUAGE: RELAXED
BREATHING: NORMAL
TOTALSCORE: 1
CONSOLABILITY: NO NEED TO CONSOLE
BREATHING: NORMAL
FACIALEXPRESSION: SMILING OR INEXPRESSIVE
BREATHING: NORMAL
FACIALEXPRESSION: SMILING OR INEXPRESSIVE
BODYLANGUAGE: RELAXED
TOTALSCORE: 0
BODYLANGUAGE: RELAXED

## 2023-08-10 ASSESSMENT — PAIN SCALES - WONG BAKER
WONGBAKER_NUMERICALRESPONSE: 0

## 2023-08-11 LAB
ANION GAP SERPL CALC-SCNC: 9 MMOL/L (ref 7–19)
APTT PPP: 50 SEC (ref 22–30)
BASOPHILS # BLD: 0 K/MCL (ref 0–0.3)
BASOPHILS NFR BLD: 0 %
BUN SERPL-MCNC: 6 MG/DL (ref 6–20)
BUN/CREAT SERPL: 9 (ref 7–25)
CALCIUM SERPL-MCNC: 9 MG/DL (ref 8.4–10.2)
CHLORIDE SERPL-SCNC: 113 MMOL/L (ref 97–110)
CO2 SERPL-SCNC: 22 MMOL/L (ref 21–32)
CREAT SERPL-MCNC: 0.67 MG/DL (ref 0.51–0.95)
DEPRECATED RDW RBC: 46.2 FL (ref 39–50)
EOSINOPHIL # BLD: 0.1 K/MCL (ref 0–0.5)
EOSINOPHIL NFR BLD: 1 %
ERYTHROCYTE [DISTWIDTH] IN BLOOD: 14.6 % (ref 11–15)
FASTING DURATION TIME PATIENT: ABNORMAL H
GFR SERPLBLD BASED ON 1.73 SQ M-ARVRAT: >90 ML/MIN
GLUCOSE BLDC GLUCOMTR-MCNC: 110 MG/DL (ref 70–99)
GLUCOSE BLDC GLUCOMTR-MCNC: 120 MG/DL (ref 70–99)
GLUCOSE SERPL-MCNC: 108 MG/DL (ref 70–99)
HCT VFR BLD CALC: 31.8 % (ref 36–46.5)
HGB BLD-MCNC: 10.2 G/DL (ref 12–15.5)
IMM GRANULOCYTES # BLD AUTO: 0.1 K/MCL (ref 0–0.2)
IMM GRANULOCYTES # BLD: 1 %
LYMPHOCYTES # BLD: 0.8 K/MCL (ref 1–4)
LYMPHOCYTES NFR BLD: 7 %
MAGNESIUM SERPL-MCNC: 1.5 MG/DL (ref 1.7–2.4)
MCH RBC QN AUTO: 28.7 PG (ref 26–34)
MCHC RBC AUTO-ENTMCNC: 32.1 G/DL (ref 32–36.5)
MCV RBC AUTO: 89.6 FL (ref 78–100)
MONOCYTES # BLD: 0.9 K/MCL (ref 0.3–0.9)
MONOCYTES NFR BLD: 9 %
NEUTROPHILS # BLD: 8.9 K/MCL (ref 1.8–7.7)
NEUTROPHILS NFR BLD: 82 %
NRBC BLD MANUAL-RTO: 0 /100 WBC
PHOSPHATE SERPL-MCNC: 2 MG/DL (ref 2.4–4.7)
PLATELET # BLD AUTO: 276 K/MCL (ref 140–450)
POTASSIUM SERPL-SCNC: 2.9 MMOL/L (ref 3.4–5.1)
RBC # BLD: 3.55 MIL/MCL (ref 4–5.2)
SODIUM SERPL-SCNC: 141 MMOL/L (ref 135–145)
WBC # BLD: 10.7 K/MCL (ref 4.2–11)

## 2023-08-11 PROCEDURE — 10000002 HB ROOM CHARGE MED SURG

## 2023-08-11 PROCEDURE — 36415 COLL VENOUS BLD VENIPUNCTURE: CPT | Performed by: INTERNAL MEDICINE

## 2023-08-11 PROCEDURE — 85025 COMPLETE CBC W/AUTO DIFF WBC: CPT | Performed by: STUDENT IN AN ORGANIZED HEALTH CARE EDUCATION/TRAINING PROGRAM

## 2023-08-11 PROCEDURE — 99233 SBSQ HOSP IP/OBS HIGH 50: CPT | Performed by: STUDENT IN AN ORGANIZED HEALTH CARE EDUCATION/TRAINING PROGRAM

## 2023-08-11 PROCEDURE — 83735 ASSAY OF MAGNESIUM: CPT | Performed by: STUDENT IN AN ORGANIZED HEALTH CARE EDUCATION/TRAINING PROGRAM

## 2023-08-11 PROCEDURE — 10004651 HB RX, NO CHARGE ITEM: Performed by: INTERNAL MEDICINE

## 2023-08-11 PROCEDURE — 10002807 HB RX 258: Performed by: INTERNAL MEDICINE

## 2023-08-11 PROCEDURE — 84100 ASSAY OF PHOSPHORUS: CPT | Performed by: STUDENT IN AN ORGANIZED HEALTH CARE EDUCATION/TRAINING PROGRAM

## 2023-08-11 PROCEDURE — 10002800 HB RX 250 W HCPCS: Performed by: STUDENT IN AN ORGANIZED HEALTH CARE EDUCATION/TRAINING PROGRAM

## 2023-08-11 PROCEDURE — 80048 BASIC METABOLIC PNL TOTAL CA: CPT | Performed by: INTERNAL MEDICINE

## 2023-08-11 PROCEDURE — 10002800 HB RX 250 W HCPCS: Performed by: INTERNAL MEDICINE

## 2023-08-11 PROCEDURE — 85730 THROMBOPLASTIN TIME PARTIAL: CPT | Performed by: INTERNAL MEDICINE

## 2023-08-11 PROCEDURE — 13003289 HB OXYGEN THERAPY DAILY

## 2023-08-11 PROCEDURE — 99231 SBSQ HOSP IP/OBS SF/LOW 25: CPT | Performed by: INTERNAL MEDICINE

## 2023-08-11 RX ORDER — POTASSIUM CHLORIDE 14.9 MG/ML
20 INJECTION INTRAVENOUS ONCE
Status: COMPLETED | OUTPATIENT
Start: 2023-08-11 | End: 2023-08-11

## 2023-08-11 RX ADMIN — POTASSIUM CHLORIDE 20 MEQ: 14.9 INJECTION, SOLUTION INTRAVENOUS at 17:31

## 2023-08-11 RX ADMIN — MORPHINE SULFATE 2 MG: 2 INJECTION, SOLUTION INTRAMUSCULAR; INTRAVENOUS at 12:06

## 2023-08-11 RX ADMIN — SODIUM CHLORIDE, PRESERVATIVE FREE 2 ML: 5 INJECTION INTRAVENOUS at 09:00

## 2023-08-11 RX ADMIN — POTASSIUM CHLORIDE 20 MEQ: 14.9 INJECTION, SOLUTION INTRAVENOUS at 15:56

## 2023-08-11 RX ADMIN — DEXTROSE AND SODIUM CHLORIDE: 5; 900 INJECTION, SOLUTION INTRAVENOUS at 02:48

## 2023-08-11 RX ADMIN — DEXTROSE AND SODIUM CHLORIDE: 5; 900 INJECTION, SOLUTION INTRAVENOUS at 15:52

## 2023-08-11 RX ADMIN — MAGNESIUM SULFATE HEPTAHYDRATE 2 G: 40 INJECTION, SOLUTION INTRAVENOUS at 09:04

## 2023-08-11 RX ADMIN — MORPHINE SULFATE 2 MG: 2 INJECTION, SOLUTION INTRAMUSCULAR; INTRAVENOUS at 17:37

## 2023-08-11 RX ADMIN — CEFEPIME 1000 MG: 1 INJECTION, POWDER, FOR SOLUTION INTRAMUSCULAR; INTRAVENOUS at 02:49

## 2023-08-11 RX ADMIN — CEFEPIME 1000 MG: 1 INJECTION, POWDER, FOR SOLUTION INTRAMUSCULAR; INTRAVENOUS at 09:00

## 2023-08-11 ASSESSMENT — PAIN SCALES - PAIN ASSESSMENT IN ADVANCED DEMENTIA (PAINAD)
BREATHING: NORMAL
CONSOLABILITY: NO NEED TO CONSOLE
NEGVOCALIZATION: OCCASIONAL MOAN OR GROAN, LOW LEVELS OF SPEECH WITH A NEGATIVE OR DISAPPROVING QUALITY
CONSOLABILITY: NO NEED TO CONSOLE
CONSOLABILITY: NO NEED TO CONSOLE
BREATHING: NORMAL
BODYLANGUAGE: TENSE, DISTRESSED, FIDGETING
TOTALSCORE: 2
BREATHING: NORMAL
TOTALSCORE: 1
BODYLANGUAGE: RELAXED
BREATHING: NORMAL
BODYLANGUAGE: RIGID, FISTS CLINCHED, KNEES PULLED UP. PUSHING OR PULLING AWAY, STRIKES OUT
BREATHING: NORMAL
FACIALEXPRESSION: SMILING OR INEXPRESSIVE
BODYLANGUAGE: RELAXED
BODYLANGUAGE: RIGID, FISTS CLINCHED, KNEES PULLED UP. PUSHING OR PULLING AWAY, STRIKES OUT
FACIALEXPRESSION: SMILING OR INEXPRESSIVE
TOTALSCORE: 2
FACIALEXPRESSION: SMILING OR INEXPRESSIVE
BREATHING: NORMAL
BREATHING: NORMAL
CONSOLABILITY: NO NEED TO CONSOLE
BREATHING: NORMAL
CONSOLABILITY: NO NEED TO CONSOLE
BODYLANGUAGE: RELAXED
CONSOLABILITY: NO NEED TO CONSOLE
FACIALEXPRESSION: SMILING OR INEXPRESSIVE
BODYLANGUAGE: RIGID, FISTS CLINCHED, KNEES PULLED UP. PUSHING OR PULLING AWAY, STRIKES OUT
BODYLANGUAGE: RIGID, FISTS CLINCHED, KNEES PULLED UP. PUSHING OR PULLING AWAY, STRIKES OUT
BREATHING: NORMAL
FACIALEXPRESSION: SMILING OR INEXPRESSIVE
TOTALSCORE: 2
BODYLANGUAGE: RIGID, FISTS CLINCHED, KNEES PULLED UP. PUSHING OR PULLING AWAY, STRIKES OUT
TOTALSCORE: 2
TOTALSCORE: 2
CONSOLABILITY: NO NEED TO CONSOLE
TOTALSCORE: 1
CONSOLABILITY: NO NEED TO CONSOLE

## 2023-08-11 ASSESSMENT — PAIN SCALES - BEHAVIORAL PAIN SCALE (BPS): BPS_SCORE: 3

## 2023-08-12 VITALS
RESPIRATION RATE: 18 BRPM | OXYGEN SATURATION: 97 % | WEIGHT: 109.79 LBS | HEART RATE: 131 BPM | SYSTOLIC BLOOD PRESSURE: 142 MMHG | TEMPERATURE: 97.3 F | DIASTOLIC BLOOD PRESSURE: 90 MMHG | BODY MASS INDEX: 19.45 KG/M2 | HEIGHT: 63 IN

## 2023-08-12 PROCEDURE — 13003289 HB OXYGEN THERAPY DAILY

## 2023-08-12 PROCEDURE — 99239 HOSP IP/OBS DSCHRG MGMT >30: CPT | Performed by: STUDENT IN AN ORGANIZED HEALTH CARE EDUCATION/TRAINING PROGRAM

## 2023-08-12 PROCEDURE — 10004651 HB RX, NO CHARGE ITEM: Performed by: INTERNAL MEDICINE

## 2023-08-12 PROCEDURE — 10002807 HB RX 258: Performed by: INTERNAL MEDICINE

## 2023-08-12 PROCEDURE — 10002800 HB RX 250 W HCPCS: Performed by: INTERNAL MEDICINE

## 2023-08-12 RX ORDER — FLUOCINOLONE ACETONIDE 0.11 MG/ML
1 OIL TOPICAL AT BEDTIME
Qty: 118.28 ML | Refills: 0 | Status: SHIPPED | OUTPATIENT
Start: 2023-08-12 | End: 2023-08-26

## 2023-08-12 RX ADMIN — DEXTROSE AND SODIUM CHLORIDE: 5; 900 INJECTION, SOLUTION INTRAVENOUS at 05:03

## 2023-08-12 RX ADMIN — SODIUM CHLORIDE, PRESERVATIVE FREE 2 ML: 5 INJECTION INTRAVENOUS at 09:01

## 2023-08-12 RX ADMIN — MORPHINE SULFATE 2 MG: 2 INJECTION, SOLUTION INTRAMUSCULAR; INTRAVENOUS at 10:20

## 2023-08-12 ASSESSMENT — PAIN SCALES - PAIN ASSESSMENT IN ADVANCED DEMENTIA (PAINAD)
CONSOLABILITY: NO NEED TO CONSOLE
FACIALEXPRESSION: SMILING OR INEXPRESSIVE
BODYLANGUAGE: RELAXED
TOTALSCORE: 0
BREATHING: NORMAL
FACIALEXPRESSION: SMILING OR INEXPRESSIVE
FACIALEXPRESSION: SMILING OR INEXPRESSIVE
TOTALSCORE: 1
TOTALSCORE: 1
CONSOLABILITY: NO NEED TO CONSOLE
BODYLANGUAGE: TENSE, DISTRESSED, FIDGETING
BREATHING: NORMAL
FACIALEXPRESSION: SMILING OR INEXPRESSIVE
TOTALSCORE: 1
BREATHING: NORMAL
TOTALSCORE: 0
BREATHING: NORMAL
CONSOLABILITY: NO NEED TO CONSOLE
BREATHING: NORMAL
BODYLANGUAGE: RELAXED
FACIALEXPRESSION: SMILING OR INEXPRESSIVE
BODYLANGUAGE: TENSE, DISTRESSED, FIDGETING
BODYLANGUAGE: TENSE, DISTRESSED, FIDGETING

## 2023-09-05 ENCOUNTER — OFFICE VISIT (OUTPATIENT)
Dept: URBAN - NONMETROPOLITAN AREA CLINIC 13 | Facility: CLINIC | Age: 73
End: 2023-09-05
Payer: MEDICARE

## 2023-09-05 VITALS
BODY MASS INDEX: 22.24 KG/M2 | HEIGHT: 66 IN | SYSTOLIC BLOOD PRESSURE: 127 MMHG | DIASTOLIC BLOOD PRESSURE: 81 MMHG | HEART RATE: 71 BPM | WEIGHT: 138.4 LBS

## 2023-09-05 DIAGNOSIS — K58.2 IRRITABLE BOWEL SYNDROME WITH ALTERNATING BOWEL HABITS: ICD-10-CM

## 2023-09-05 DIAGNOSIS — K60.1 CHRONIC ANAL FISSURE: ICD-10-CM

## 2023-09-05 DIAGNOSIS — K21.9 GERD: ICD-10-CM

## 2023-09-05 DIAGNOSIS — K62.5 BRIGHT RED BLOOD PER RECTUM: ICD-10-CM

## 2023-09-05 PROCEDURE — 99213 OFFICE O/P EST LOW 20 MIN: CPT | Performed by: NURSE PRACTITIONER

## 2023-09-05 RX ORDER — TRAZODONE HYDROCHLORIDE 50 MG/1
1 TABLET AT BEDTIME AS NEEDED TABLET ORAL ONCE A DAY
Status: ACTIVE | COMMUNITY

## 2023-09-05 RX ORDER — DOCUSATE SODIUM 100 MG/1
1 CAPSULE AS NEEDED CAPSULE ORAL ONCE A DAY
Status: ACTIVE | COMMUNITY

## 2023-09-05 RX ORDER — CLONAZEPAM 0.5 MG/1
TAKE 1 TABLET BY ORAL ROUTE ONCE A DAY (AT BEDTIME) TABLET ORAL 1
Qty: 0 | Refills: 0 | Status: ON HOLD | COMMUNITY
Start: 1900-01-01

## 2023-09-05 RX ORDER — PRUCALOPRIDE 2 MG/1
1 TABLET TABLET, FILM COATED ORAL ONCE A DAY
Qty: 90 TABLET | Refills: 3 | OUTPATIENT

## 2023-09-05 RX ORDER — ESTRADIOL 10 UG/1
_INSERT 1 TABLET (10 MCG) BY VAGINAL ROUTE TWICE WEEKLY INSERT VAGINAL
Qty: 1 | Refills: 0 | Status: ACTIVE | COMMUNITY
Start: 1900-01-01

## 2023-09-05 RX ORDER — RABEPRAZOLE SODIUM 20 MG/1
TAKE 1 TABLET (20 MG) BY ORAL ROUTE ONCE DAILY SWALLOWING WHOLE. DO NOT CRUSH, CHEW AND/OR DIVIDE TABLET, DELAYED RELEASE ORAL 1
Qty: 90 | Refills: 3

## 2023-09-05 RX ORDER — AMLODIPINE BESYLATE AND BENAZEPRIL HYDROCHLORIDE 5; 20 MG/1; MG/1
AS DIRECTED CAPSULE ORAL
Status: ACTIVE | COMMUNITY

## 2023-09-05 RX ORDER — TRAMADOL HYDROCHLORIDE 50 MG/1
TAKE 1 TABLET (50 MG) BY ORAL ROUTE EVERY 6 HOURS AS NEEDED TABLET ORAL
Qty: 0 | Refills: 0 | Status: ACTIVE | COMMUNITY
Start: 1900-01-01

## 2023-09-05 RX ORDER — DICYCLOMINE HYDROCHLORIDE 10 MG/1
1 TABLET AS NEEDED FOR CRAMPING/DIARRHEA CAPSULE ORAL THREE TIMES A DAY
Qty: 90 TABLET | Refills: 6 | Status: ACTIVE | COMMUNITY

## 2023-09-05 RX ORDER — EPINASTINE HYDROCHLORIDE 0.5 MG/ML
1 DROP INTO AFFECTED EYE SOLUTION/ DROPS OPHTHALMIC TWICE A DAY
Status: ACTIVE | COMMUNITY

## 2023-09-05 RX ORDER — ADALIMUMAB 40MG/0.8ML
INJECT 0.8 MILLILITER (40 MG) BY SUBCUTANEOUS ROUTE EVERY 2 WEEKS KIT SUBCUTANEOUS
Qty: 1 | Refills: 0 | Status: ACTIVE | COMMUNITY
Start: 1900-01-01

## 2023-09-05 RX ORDER — ONDANSETRON HYDROCHLORIDE 4 MG/1
1 TABLET EVERY 6-8HRS AS NEEDED FOR NAUSEA TABLET, FILM COATED ORAL
Qty: 30 | Refills: 3 | Status: ACTIVE | COMMUNITY

## 2023-09-05 RX ORDER — FAMOTIDINE 20 MG/1
1 TABLET AT BEDTIME AS NEEDED TABLET, FILM COATED ORAL ONCE A DAY
Qty: 90 TABLET | Refills: 11 | Status: ACTIVE | COMMUNITY
Start: 2023-05-22

## 2023-09-05 RX ORDER — POLYETHYLENE GLYCOL 3350 17 G/17G
AS DIRECTED POWDER, FOR SOLUTION ORAL
Status: ACTIVE | COMMUNITY

## 2023-09-05 RX ORDER — FAMOTIDINE 20 MG/1
1 TABLET AT BEDTIME AS NEEDED TABLET, FILM COATED ORAL ONCE A DAY
Qty: 90 TABLET | Refills: 11 | OUTPATIENT

## 2023-09-05 RX ORDER — METHENAMINE HIPPURATE 1000 MG/1
1 TABLET TABLET ORAL TWICE A DAY
Status: ACTIVE | COMMUNITY

## 2023-09-05 RX ORDER — CHOLECALCIFEROL (VITAMIN D3) 50 MCG
1 CAPSULE CAPSULE ORAL ONCE A DAY
Status: ACTIVE | COMMUNITY

## 2023-09-05 RX ORDER — RABEPRAZOLE SODIUM 20 MG/1
TAKE 1 TABLET (20 MG) BY ORAL ROUTE ONCE DAILY SWALLOWING WHOLE. DO NOT CRUSH, CHEW AND/OR DIVIDE TABLET, DELAYED RELEASE ORAL 1
Qty: 90 | Refills: 3 | Status: ACTIVE | COMMUNITY

## 2023-09-05 RX ORDER — ONDANSETRON HYDROCHLORIDE 4 MG/1
1 TABLET EVERY 6-8HRS AS NEEDED FOR NAUSEA TABLET, FILM COATED ORAL
Qty: 30 | Refills: 3 | OUTPATIENT

## 2023-09-05 RX ORDER — PRUCALOPRIDE 2 MG/1
1 TABLET TABLET, FILM COATED ORAL ONCE A DAY
Qty: 90 TABLET | Refills: 3 | Status: ACTIVE | COMMUNITY
Start: 2023-05-22 | End: 2024-05-16

## 2023-09-05 RX ORDER — DICLOFENAC SODIUM 10 MG/G
APPLY 2 GRAM TO THE AFFECTED AREA(S) BY TOPICAL ROUTE 4 TIMES PER DAY GEL TOPICAL
Qty: 1 | Refills: 0 | Status: ACTIVE | COMMUNITY
Start: 1900-01-01

## 2023-09-05 NOTE — HPI-TODAY'S VISIT:
9/5/2023 Ms. 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 Her reflux is well controlled with aciphex. She has needed the pepcid. She just got her motegrity a week ago. She has been using dulcolax and miralax since her last OV. She feels full and has not had a BM in 4 days since starting the motegrity. She denies any nausea or vomiting. She has not needed any zofran. Her fissure is better with the ointment. CS

## 2023-09-05 NOTE — HPI-OTHER HISTORIES
8/21/2019 Bettye is a new patient referred for gastritis. She states that she has had issues with dysphagia over the last few months. She has long standing reflux and has been on aciphex. More recently she feels like this is not as effective. She has solid food dysphagia. She has not had any liquid food dysphagia. She has a history of candidal esophagitis in the past while she is on prednisone. She is on Humira now for RA EGD was essentially normal. negative for PUD or H pylori. Today she returns and is feeling well. She thinks some of her upper Gi sx are anxiety related. Things are somewhat better now. She does not want to change from Aciphex at this point. She is having some constipation. She is using prn Dulcolax. Miralax causedbloating. her weight is stable. 8/21/19 Bettye returns for follow up/for a new issue. She reports that she has had longstanding issues with mild constipation. She has taken dulcolax for this. Now she is requiring addition of miralax and she does notice some increasing bloating and gas with miralax. She had also had worsening loose stools after starting miralax sos she has had to cut back. She is wondering if there are other options. She is not having any blood in her stools. She has a decreased appetite and some foods taste different to her. She denies any otehr abdominal sx. Her upper GI symptoms seem much improved since we saw her in 2017   1/26/2021 Ms. Bettye Coto is here for nausea, loss of appetite, reflux, and constipation. She was last seen for reflux in 2017 with a normal EGD. She has been on aciphex and her symptoms were thought to be anxiety driven. She started having nausea and loss of appetite just before [place]. Foods just did not taste good. She admits to having a lot of stress at that time.  She was seen in 2019 for constipation. She had a colonoscopy that was normal except for a looping colon. She has been on miralax 0.5 cap a day. She has had trouble regulating her bowels. If she takes too much miralax she will have diarrhea and if too little she will have hard balls. She is having a lot of gas and rumbling. She has seen a small amount of blood when she wipes. She has a hx of internal hemorrhoids. CS  2/16/2021 Ms. Bettye Coto is here for nausea, loss of appetite, reflux, and constipation. She was last seen for reflux in 2017 with a normal EGD. She has been on aciphex and her symptoms were thought to be anxiety driven. She started having nausea and loss of appetite just before [place]. Foods just did not taste good. She was changed from aciphex to protonix 40mg BID. After 2 weeks, her reflux got worse. She started having a lot of regurgitation and worse nausea. She called the office and was changed back to aciphex and given AMT. She took two doses and stopped. She was having severe dry mouth and sedation.  She had a colonoscopy in 2019 that was normal except for a looping colon. She has been working on finding the right dose of miralax and this is working well. CS   4/1/2021 Ms. Bettye Coto is here for f/u of nausea, loss of appetite, reflux, and constipation. She had been on aciphex and doing well until just before [place]. She started having nausea and loss of appetite. She was changed from aciphex to protonix 40mg BID. After 2 weeks, her reflux got worse with regurgitation and nausea. She was changed back to aciphex and AMT was started. She took two doses  of the AMT and stopped due to severe dry mouth and sedation. Her EGD was repeated with mild gastritis. Today, she feels her reflux is better with back on the aciphex. She is still having some nasuea nad scared to eat. Her weight is down a few more pounds. She wonders if gluten is the issue. She has not been able to tolerate Buspar in the past. She is seeing her PCP later this month and will talk to him about her stress/anxiety. CS   7/1/2021 Ms. Bettye Coto is here for f/u of nausea, loss of appetite, reflux, and constipation. Since her last OV, her reflux and nausea had been well controlled. Her appetite had also improved. She had only one episode of nausea after eating steak and peppers.  Her constipation is well controlled with fiber and miralax. The miralax every 3-4 weeks works a little too well and causes diarrhea. CS   12/30/2021 Ms. Bettye Coto is here for f/u of nausea, loss of appetite, reflux, and constipation. Today, her reflux is well controlled. She denies any return of nausea or loss of appetite. Her main complaint today is change in stool and fecal incontinence. She is usually constipated and takes fiber and miralax prn. She was started on methotrexate injection and has been under a lot of stress during this holiday season. She is having sticky stools after she eats that is hard to get all out and has had some fecal incontinence. She took half a AMT and it helped. It just made her so sleepy- she did take it during the day. She wonders if she should stop the methotrexate.  She also has started having bladder incontinence and urology told her that her bladder had dropped. CS   1/14/2022 Ms. Bettye Coto is evaluated via telehealth for f/u of bowel habit change. At her last OV, she denies any reflux or return of nausea or loss of appetite. She was having sticky stools after eating that was hard to get all out with fecal incontinence. She was told to continue fiber, hold the miralax, and given bentyl. She took bentyl for one day and the BM after eating stopped. She was then back to constipated. She has added back miralax. She is back to having gas and bloating and trouble getting the right miralax dose. CS  3/11/2022 Ms. Bettye Coto is evaluated via telehealth for f/u of bowel habit change. She is taking aciphex and her reflux is well controlled. She is taking the miralax and her bowels are now normal. If she gets a little backed up she will add gasx but this is rare. Overall, she is doing really well from a GI standpoint. She may need bladder surgery soon. CS  9/7/2022 Ms. Bettye Coto is here for f/u of GERD, IBS-C, and hospital f/u of SBO. Since her last OV, she has a hysterectomy and pelvic floor surgery by Dr Jett in June. A month later, she started having nausea and diarrhea. She was feeling really weak and dehydrated so she went to the ER. She was found to have a SBO. She had an NGT. After about 7 days, she had a PICC line and TPN. ON day 8, her NGT was removed and her diet was advanced. She did not require surgery. Since ariving home, she has had a lot of gas, bloating, and incomplete BM. She feels constipated but can not have a BM or only a small amount comes out. She is taking fiber daily and miralax prn.  Her  was dx with dementia and this has increased her anxiety and stress level. CS  1/25/2023 Ms. Bettye Coto is here for f/u of GERD, IBS-C, and SBO. Last summer, she had a hysterectomy and pelvic floor surgery by Dr Jett in June.  She started having nausea and diarrhea and found to have a SBO. This was felt to be surgery related. After the SBO, she had a lot of gas, bloating, and incomplete BM. She was given a round of xifaxan and her bloating and gas improved until she went to a holiday party in December. She was given a round of flagyl and this helped. Today, her main issue is constipation. The miralax takes a week to work and then works too well. She has been eating very little veggies. Nutrition supplements have helped and her weight is up.  Her  was dx with dementia and this has increased her anxiety and stress level. CS  5/22/2023 Ms. Bettye Coto is here for f/u of GERD, IBS-C, and SBO. Last summer, she had a hysterectomy and pelvic floor surgery by Dr Jett in June.  She started having nausea and diarrhea and found to have a SBO. It has taken some time but she is finally starting to feel better. Her weight is stable. She denies any further gas or bloating. She is taking fiber at night and playing with the miralax dose. She has not been able to find the right amount to keep her regular without diarrhea. She is having to wipe a lot and now has a fissure that is painful. CS

## 2023-09-05 NOTE — PHYSICAL EXAM HENT:
Head,  normocephalic,  atraumatic,  Face,  Face within normal limits,  Ears,  External ears within normal limits,  Nose/Nasopharynx,  External nose  normal appearance,  Lips,  Appearance normal Medication refill requested sent to preferred pharmacy at this time.

## 2023-11-13 ENCOUNTER — WEB ENCOUNTER (OUTPATIENT)
Dept: URBAN - NONMETROPOLITAN AREA CLINIC 13 | Facility: CLINIC | Age: 73
End: 2023-11-13

## 2023-12-05 ENCOUNTER — OFFICE VISIT (OUTPATIENT)
Dept: URBAN - NONMETROPOLITAN AREA CLINIC 13 | Facility: CLINIC | Age: 73
End: 2023-12-05
Payer: MEDICARE

## 2023-12-05 VITALS
DIASTOLIC BLOOD PRESSURE: 84 MMHG | HEIGHT: 66 IN | BODY MASS INDEX: 21.89 KG/M2 | WEIGHT: 136.2 LBS | SYSTOLIC BLOOD PRESSURE: 129 MMHG | HEART RATE: 71 BPM

## 2023-12-05 DIAGNOSIS — K58.2 IRRITABLE BOWEL SYNDROME WITH ALTERNATING BOWEL HABITS: ICD-10-CM

## 2023-12-05 DIAGNOSIS — K62.5 BRIGHT RED BLOOD PER RECTUM: ICD-10-CM

## 2023-12-05 DIAGNOSIS — K21.9 GERD: ICD-10-CM

## 2023-12-05 DIAGNOSIS — K60.2 ANAL FISSURE: ICD-10-CM

## 2023-12-05 PROBLEM — 82934008: Status: ACTIVE | Noted: 2023-05-22

## 2023-12-05 PROCEDURE — 99213 OFFICE O/P EST LOW 20 MIN: CPT | Performed by: NURSE PRACTITIONER

## 2023-12-05 RX ORDER — TRAMADOL HYDROCHLORIDE 50 MG/1
TAKE 1 TABLET (50 MG) BY ORAL ROUTE EVERY 6 HOURS AS NEEDED TABLET ORAL
Qty: 0 | Refills: 0 | Status: ACTIVE | COMMUNITY
Start: 1900-01-01

## 2023-12-05 RX ORDER — RABEPRAZOLE SODIUM 20 MG/1
TAKE 1 TABLET (20 MG) BY ORAL ROUTE ONCE DAILY SWALLOWING WHOLE. DO NOT CRUSH, CHEW AND/OR DIVIDE TABLET, DELAYED RELEASE ORAL 1
Qty: 90 | Refills: 3

## 2023-12-05 RX ORDER — CHOLECALCIFEROL (VITAMIN D3) 50 MCG
1 CAPSULE CAPSULE ORAL ONCE A DAY
Status: ACTIVE | COMMUNITY

## 2023-12-05 RX ORDER — PRUCALOPRIDE 2 MG/1
1 TABLET TABLET, FILM COATED ORAL ONCE A DAY
Qty: 90 TABLET | Refills: 3 | OUTPATIENT

## 2023-12-05 RX ORDER — CLONAZEPAM 0.5 MG/1
TAKE 1 TABLET BY ORAL ROUTE ONCE A DAY (AT BEDTIME) TABLET ORAL 1
Qty: 0 | Refills: 0 | Status: ON HOLD | COMMUNITY
Start: 1900-01-01

## 2023-12-05 RX ORDER — DOCUSATE SODIUM 100 MG/1
1 CAPSULE AS NEEDED CAPSULE ORAL ONCE A DAY
Status: ACTIVE | COMMUNITY

## 2023-12-05 RX ORDER — TRAZODONE HYDROCHLORIDE 50 MG/1
1 TABLET AT BEDTIME AS NEEDED TABLET ORAL ONCE A DAY
Status: ACTIVE | COMMUNITY

## 2023-12-05 RX ORDER — EPINASTINE HYDROCHLORIDE 0.5 MG/ML
1 DROP INTO AFFECTED EYE SOLUTION/ DROPS OPHTHALMIC TWICE A DAY
Status: ACTIVE | COMMUNITY

## 2023-12-05 RX ORDER — DICYCLOMINE HYDROCHLORIDE 10 MG/1
1 TABLET AS NEEDED FOR CRAMPING/DIARRHEA CAPSULE ORAL THREE TIMES A DAY
Qty: 90 TABLET | Refills: 6 | Status: ACTIVE | COMMUNITY

## 2023-12-05 RX ORDER — AMLODIPINE BESYLATE AND BENAZEPRIL HYDROCHLORIDE 5; 20 MG/1; MG/1
AS DIRECTED CAPSULE ORAL
Status: ACTIVE | COMMUNITY

## 2023-12-05 RX ORDER — RABEPRAZOLE SODIUM 20 MG/1
TAKE 1 TABLET (20 MG) BY ORAL ROUTE ONCE DAILY SWALLOWING WHOLE. DO NOT CRUSH, CHEW AND/OR DIVIDE TABLET, DELAYED RELEASE ORAL 1
Qty: 90 | Refills: 3 | Status: ACTIVE | COMMUNITY

## 2023-12-05 RX ORDER — ADALIMUMAB 40MG/0.8ML
INJECT 0.8 MILLILITER (40 MG) BY SUBCUTANEOUS ROUTE EVERY 2 WEEKS KIT SUBCUTANEOUS
Qty: 1 | Refills: 0 | Status: ACTIVE | COMMUNITY
Start: 1900-01-01

## 2023-12-05 RX ORDER — ESTRADIOL 10 UG/1
_INSERT 1 TABLET (10 MCG) BY VAGINAL ROUTE TWICE WEEKLY INSERT VAGINAL
Qty: 1 | Refills: 0 | Status: ACTIVE | COMMUNITY
Start: 1900-01-01

## 2023-12-05 RX ORDER — ONDANSETRON HYDROCHLORIDE 4 MG/1
1 TABLET EVERY 6-8HRS AS NEEDED FOR NAUSEA TABLET, FILM COATED ORAL
Qty: 30 | Refills: 3 | Status: ACTIVE | COMMUNITY

## 2023-12-05 RX ORDER — POLYETHYLENE GLYCOL 3350 17 G/17G
AS DIRECTED POWDER, FOR SOLUTION ORAL
Status: ACTIVE | COMMUNITY

## 2023-12-05 RX ORDER — PRUCALOPRIDE 2 MG/1
1 TABLET TABLET, FILM COATED ORAL ONCE A DAY
Qty: 90 TABLET | Refills: 3 | Status: ACTIVE | COMMUNITY

## 2023-12-05 RX ORDER — FAMOTIDINE 20 MG/1
1 TABLET AT BEDTIME AS NEEDED TABLET, FILM COATED ORAL ONCE A DAY
Qty: 90 TABLET | Refills: 11 | Status: ACTIVE | COMMUNITY

## 2023-12-05 RX ORDER — DICLOFENAC SODIUM 10 MG/G
APPLY 2 GRAM TO THE AFFECTED AREA(S) BY TOPICAL ROUTE 4 TIMES PER DAY GEL TOPICAL
Qty: 1 | Refills: 0 | Status: ACTIVE | COMMUNITY
Start: 1900-01-01

## 2023-12-05 RX ORDER — METHENAMINE HIPPURATE 1000 MG/1
1 TABLET TABLET ORAL TWICE A DAY
Status: ACTIVE | COMMUNITY

## 2023-12-05 RX ORDER — FAMOTIDINE 20 MG/1
1 TABLET AT BEDTIME AS NEEDED TABLET, FILM COATED ORAL ONCE A DAY
Qty: 90 TABLET | Refills: 11 | OUTPATIENT

## 2023-12-05 RX ORDER — ONDANSETRON HYDROCHLORIDE 4 MG/1
1 TABLET EVERY 6-8HRS AS NEEDED FOR NAUSEA TABLET, FILM COATED ORAL
Qty: 30 | Refills: 3 | OUTPATIENT

## 2023-12-05 NOTE — HPI-OTHER HISTORIES
8/21/2019 Bettye is a new patient referred for gastritis. She states that she has had issues with dysphagia over the last few months. She has long standing reflux and has been on aciphex. More recently she feels like this is not as effective. She has solid food dysphagia. She has not had any liquid food dysphagia. She has a history of candidal esophagitis in the past while she is on prednisone. She is on Humira now for RA EGD was essentially normal. negative for PUD or H pylori. Today she returns and is feeling well. She thinks some of her upper Gi sx are anxiety related. Things are somewhat better now. She does not want to change from Aciphex at this point. She is having some constipation. She is using prn Dulcolax. Miralax causedbloating. her weight is stable. 8/21/19 Bettye returns for follow up/for a new issue. She reports that she has had longstanding issues with mild constipation. She has taken dulcolax for this. Now she is requiring addition of miralax and she does notice some increasing bloating and gas with miralax. She had also had worsening loose stools after starting miralax sos she has had to cut back. She is wondering if there are other options. She is not having any blood in her stools. She has a decreased appetite and some foods taste different to her. She denies any otehr abdominal sx. Her upper GI symptoms seem much improved since we saw her in 2017   1/26/2021 Ms. Bettye Coto is here for nausea, loss of appetite, reflux, and constipation. She was last seen for reflux in 2017 with a normal EGD. She has been on aciphex and her symptoms were thought to be anxiety driven. She started having nausea and loss of appetite just before [place]. Foods just did not taste good. She admits to having a lot of stress at that time.  She was seen in 2019 for constipation. She had a colonoscopy that was normal except for a looping colon. She has been on miralax 0.5 cap a day. She has had trouble regulating her bowels. If she takes too much miralax she will have diarrhea and if too little she will have hard balls. She is having a lot of gas and rumbling. She has seen a small amount of blood when she wipes. She has a hx of internal hemorrhoids. CS  2/16/2021 Ms. Bettye Coto is here for nausea, loss of appetite, reflux, and constipation. She was last seen for reflux in 2017 with a normal EGD. She has been on aciphex and her symptoms were thought to be anxiety driven. She started having nausea and loss of appetite just before [place]. Foods just did not taste good. She was changed from aciphex to protonix 40mg BID. After 2 weeks, her reflux got worse. She started having a lot of regurgitation and worse nausea. She called the office and was changed back to aciphex and given AMT. She took two doses and stopped. She was having severe dry mouth and sedation.  She had a colonoscopy in 2019 that was normal except for a looping colon. She has been working on finding the right dose of miralax and this is working well. CS   4/1/2021 Ms. Bettye Coto is here for f/u of nausea, loss of appetite, reflux, and constipation. She had been on aciphex and doing well until just before [place]. She started having nausea and loss of appetite. She was changed from aciphex to protonix 40mg BID. After 2 weeks, her reflux got worse with regurgitation and nausea. She was changed back to aciphex and AMT was started. She took two doses  of the AMT and stopped due to severe dry mouth and sedation. Her EGD was repeated with mild gastritis. Today, she feels her reflux is better with back on the aciphex. She is still having some nasuea nad scared to eat. Her weight is down a few more pounds. She wonders if gluten is the issue. She has not been able to tolerate Buspar in the past. She is seeing her PCP later this month and will talk to him about her stress/anxiety. CS   7/1/2021 Ms. Bettye Coto is here for f/u of nausea, loss of appetite, reflux, and constipation. Since her last OV, her reflux and nausea had been well controlled. Her appetite had also improved. She had only one episode of nausea after eating steak and peppers.  Her constipation is well controlled with fiber and miralax. The miralax every 3-4 weeks works a little too well and causes diarrhea. CS   12/30/2021 Ms. Bettye Coto is here for f/u of nausea, loss of appetite, reflux, and constipation. Today, her reflux is well controlled. She denies any return of nausea or loss of appetite. Her main complaint today is change in stool and fecal incontinence. She is usually constipated and takes fiber and miralax prn. She was started on methotrexate injection and has been under a lot of stress during this holiday season. She is having sticky stools after she eats that is hard to get all out and has had some fecal incontinence. She took half a AMT and it helped. It just made her so sleepy- she did take it during the day. She wonders if she should stop the methotrexate.  She also has started having bladder incontinence and urology told her that her bladder had dropped. CS   1/14/2022 Ms. Bettye Coto is evaluated via telehealth for f/u of bowel habit change. At her last OV, she denies any reflux or return of nausea or loss of appetite. She was having sticky stools after eating that was hard to get all out with fecal incontinence. She was told to continue fiber, hold the miralax, and given bentyl. She took bentyl for one day and the BM after eating stopped. She was then back to constipated. She has added back miralax. She is back to having gas and bloating and trouble getting the right miralax dose. CS  3/11/2022 Ms. Bettye Coto is evaluated via telehealth for f/u of bowel habit change. She is taking aciphex and her reflux is well controlled. She is taking the miralax and her bowels are now normal. If she gets a little backed up she will add gasx but this is rare. Overall, she is doing really well from a GI standpoint. She may need bladder surgery soon. CS  9/7/2022 Ms. Bettye Coto is here for f/u of GERD, IBS-C, and hospital f/u of SBO. Since her last OV, she has a hysterectomy and pelvic floor surgery by Dr Jett in June. A month later, she started having nausea and diarrhea. She was feeling really weak and dehydrated so she went to the ER. She was found to have a SBO. She had an NGT. After about 7 days, she had a PICC line and TPN. ON day 8, her NGT was removed and her diet was advanced. She did not require surgery. Since ariving home, she has had a lot of gas, bloating, and incomplete BM. She feels constipated but can not have a BM or only a small amount comes out. She is taking fiber daily and miralax prn.  Her  was dx with dementia and this has increased her anxiety and stress level. CS  1/25/2023 Ms. Bettye Coto is here for f/u of GERD, IBS-C, and SBO. Last summer, she had a hysterectomy and pelvic floor surgery by Dr Jett in June.  She started having nausea and diarrhea and found to have a SBO. This was felt to be surgery related. After the SBO, she had a lot of gas, bloating, and incomplete BM. She was given a round of xifaxan and her bloating and gas improved until she went to a holiday party in December. She was given a round of flagyl and this helped. Today, her main issue is constipation. The miralax takes a week to work and then works too well. She has been eating very little veggies. Nutrition supplements have helped and her weight is up.  Her  was dx with dementia and this has increased her anxiety and stress level. CS  5/22/2023 Ms. Bettye Coto is here for f/u of GERD, IBS-C, and SBO. Last summer, she had a hysterectomy and pelvic floor surgery by Dr Jett in June.  She started having nausea and diarrhea and found to have a SBO. It has taken some time but she is finally starting to feel better. Her weight is stable. She denies any further gas or bloating. She is taking fiber at night and playing with the miralax dose. She has not been able to find the right amount to keep her regular without diarrhea. She is having to wipe a lot and now has a fissure that is painful. CS  9/5/2023 Ms. 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 Her reflux is well controlled with aciphex. She has needed the pepcid. She just got her motegrity a week ago. She has been using dulcolax and miralax since her last OV. She feels full and has not had a BM in 4 days since starting the motegrity. She denies any nausea or vomiting. She has not needed any zofran. Her fissure is better with the ointment. CS

## 2023-12-05 NOTE — HPI-TODAY'S VISIT:
12/5/2023 Ms. 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 Her reflux is well controlled with aciphex. She started the motegrity and it was doing well until 11/13. She started going too much and was having a lot of cramping from it. She was changed to EOD but then got constipated. She has had to add miralax again. The ibsrela does help when she gets super backed up. She denies any nausea or vomiting. CS

## 2024-02-06 ENCOUNTER — OV EP (OUTPATIENT)
Dept: URBAN - NONMETROPOLITAN AREA CLINIC 13 | Facility: CLINIC | Age: 74
End: 2024-02-06
Payer: MEDICARE

## 2024-02-06 VITALS
HEIGHT: 66 IN | HEART RATE: 71 BPM | WEIGHT: 137.7 LBS | SYSTOLIC BLOOD PRESSURE: 145 MMHG | BODY MASS INDEX: 22.13 KG/M2 | DIASTOLIC BLOOD PRESSURE: 81 MMHG

## 2024-02-06 DIAGNOSIS — K62.5 BRIGHT RED BLOOD PER RECTUM: ICD-10-CM

## 2024-02-06 DIAGNOSIS — K21.9 GERD: ICD-10-CM

## 2024-02-06 DIAGNOSIS — K59.09 CHANGE IN BOWEL MOVEMENTS INTERMITTENT CONSTIPATION. URGENCY IN THE MORNING.: ICD-10-CM

## 2024-02-06 DIAGNOSIS — R11.0 NAUSEA: ICD-10-CM

## 2024-02-06 PROBLEM — 14760008: Status: ACTIVE | Noted: 2021-01-26

## 2024-02-06 PROCEDURE — 99214 OFFICE O/P EST MOD 30 MIN: CPT | Performed by: NURSE PRACTITIONER

## 2024-02-06 RX ORDER — POLYETHYLENE GLYCOL 3350 17 G/17G
AS DIRECTED POWDER, FOR SOLUTION ORAL
Status: ACTIVE | COMMUNITY

## 2024-02-06 RX ORDER — FAMOTIDINE 20 MG/1
1 TABLET AT BEDTIME AS NEEDED TABLET, FILM COATED ORAL ONCE A DAY
Qty: 90 TABLET | Refills: 11 | Status: ACTIVE | COMMUNITY

## 2024-02-06 RX ORDER — EPINASTINE HYDROCHLORIDE 0.5 MG/ML
1 DROP INTO AFFECTED EYE SOLUTION/ DROPS OPHTHALMIC TWICE A DAY
Status: ACTIVE | COMMUNITY

## 2024-02-06 RX ORDER — ONDANSETRON HYDROCHLORIDE 4 MG/1
1 TABLET EVERY 6-8HRS AS NEEDED FOR NAUSEA TABLET, FILM COATED ORAL
Qty: 30 | Refills: 3 | OUTPATIENT

## 2024-02-06 RX ORDER — RABEPRAZOLE SODIUM 20 MG/1
TAKE 1 TABLET (20 MG) BY ORAL ROUTE ONCE DAILY SWALLOWING WHOLE. DO NOT CRUSH, CHEW AND/OR DIVIDE TABLET, DELAYED RELEASE ORAL 1
Qty: 90 | Refills: 3 | Status: ACTIVE | COMMUNITY

## 2024-02-06 RX ORDER — DICLOFENAC SODIUM 10 MG/G
APPLY 2 GRAM TO THE AFFECTED AREA(S) BY TOPICAL ROUTE 4 TIMES PER DAY GEL TOPICAL
Qty: 1 | Refills: 0 | Status: ACTIVE | COMMUNITY
Start: 1900-01-01

## 2024-02-06 RX ORDER — PRUCALOPRIDE 2 MG/1
1 TABLET TABLET, FILM COATED ORAL ONCE A DAY
Qty: 90 TABLET | Refills: 3 | Status: ACTIVE | COMMUNITY

## 2024-02-06 RX ORDER — TRAZODONE HYDROCHLORIDE 50 MG/1
1 TABLET AT BEDTIME AS NEEDED TABLET ORAL ONCE A DAY
Status: ACTIVE | COMMUNITY

## 2024-02-06 RX ORDER — FAMOTIDINE 20 MG/1
1 TABLET AT BEDTIME AS NEEDED TABLET, FILM COATED ORAL ONCE A DAY
Qty: 90 TABLET | Refills: 11 | OUTPATIENT

## 2024-02-06 RX ORDER — METRONIDAZOLE 500 MG/1
1 TABLET TABLET, FILM COATED ORAL THREE TIMES A DAY
Qty: 42 TABLET | Refills: 1 | OUTPATIENT
Start: 2023-01-12 | End: 2024-03-05

## 2024-02-06 RX ORDER — DICYCLOMINE HYDROCHLORIDE 10 MG/1
1 TABLET AS NEEDED FOR CRAMPING/DIARRHEA CAPSULE ORAL THREE TIMES A DAY
Qty: 90 TABLET | Refills: 6 | Status: ACTIVE | COMMUNITY

## 2024-02-06 RX ORDER — ADALIMUMAB 40MG/0.8ML
INJECT 0.8 MILLILITER (40 MG) BY SUBCUTANEOUS ROUTE EVERY 2 WEEKS KIT SUBCUTANEOUS
Qty: 1 | Refills: 0 | Status: ACTIVE | COMMUNITY
Start: 1900-01-01

## 2024-02-06 RX ORDER — PRUCALOPRIDE 2 MG/1
1 TABLET TABLET, FILM COATED ORAL ONCE A DAY
Qty: 90 TABLET | Refills: 3 | OUTPATIENT

## 2024-02-06 RX ORDER — METHENAMINE HIPPURATE 1000 MG/1
1 TABLET TABLET ORAL TWICE A DAY
Status: ACTIVE | COMMUNITY

## 2024-02-06 RX ORDER — CHOLECALCIFEROL (VITAMIN D3) 50 MCG
1 CAPSULE CAPSULE ORAL ONCE A DAY
Status: ACTIVE | COMMUNITY

## 2024-02-06 RX ORDER — DOCUSATE SODIUM 100 MG/1
1 CAPSULE AS NEEDED CAPSULE ORAL ONCE A DAY
Status: ACTIVE | COMMUNITY

## 2024-02-06 RX ORDER — ESTRADIOL 10 UG/1
_INSERT 1 TABLET (10 MCG) BY VAGINAL ROUTE TWICE WEEKLY INSERT VAGINAL
Qty: 1 | Refills: 0 | Status: ACTIVE | COMMUNITY
Start: 1900-01-01

## 2024-02-06 RX ORDER — CLONAZEPAM 0.5 MG/1
TAKE 1 TABLET BY ORAL ROUTE ONCE A DAY (AT BEDTIME) TABLET ORAL 1
Qty: 0 | Refills: 0 | Status: ON HOLD | COMMUNITY
Start: 1900-01-01

## 2024-02-06 RX ORDER — TRAMADOL HYDROCHLORIDE 50 MG/1
TAKE 1 TABLET (50 MG) BY ORAL ROUTE EVERY 6 HOURS AS NEEDED TABLET ORAL
Qty: 0 | Refills: 0 | Status: ACTIVE | COMMUNITY
Start: 1900-01-01

## 2024-02-06 RX ORDER — METRONIDAZOLE 500 MG/1
1 TABLET TABLET, FILM COATED ORAL THREE TIMES A DAY
Qty: 42 TABLET | Refills: 0 | Status: ACTIVE | COMMUNITY
Start: 2023-01-12 | End: 2024-02-16

## 2024-02-06 RX ORDER — ONDANSETRON HYDROCHLORIDE 4 MG/1
1 TABLET EVERY 6-8HRS AS NEEDED FOR NAUSEA TABLET, FILM COATED ORAL
Qty: 30 | Refills: 3 | Status: ACTIVE | COMMUNITY

## 2024-02-06 RX ORDER — AMLODIPINE BESYLATE AND BENAZEPRIL HYDROCHLORIDE 5; 20 MG/1; MG/1
AS DIRECTED CAPSULE ORAL
Status: ACTIVE | COMMUNITY

## 2024-02-06 RX ORDER — RABEPRAZOLE SODIUM 20 MG/1
TAKE 1 TABLET (20 MG) BY ORAL ROUTE ONCE DAILY SWALLOWING WHOLE. DO NOT CRUSH, CHEW AND/OR DIVIDE TABLET, DELAYED RELEASE ORAL 1
Qty: 90 | Refills: 3

## 2024-02-06 NOTE — HPI-TODAY'S VISIT:
2/6/2024 Ms. 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 Her reflux is well controlled with aciphex. She has not been able to tolerate the motegrity it never let her know when she would have a BM. She started having nasuea, reflux, and diarrhea. She started a round of flagyl and this is helping. She has not been able to get her bowels on a schedule. She continues to have a lot of stress with her 's dementia. CS

## 2024-02-06 NOTE — HPI-OTHER HISTORIES
8/21/2019 Bettye is a new patient referred for gastritis. She states that she has had issues with dysphagia over the last few months. She has long standing reflux and has been on aciphex. More recently she feels like this is not as effective. She has solid food dysphagia. She has not had any liquid food dysphagia. She has a history of candidal esophagitis in the past while she is on prednisone. She is on Humira now for RA EGD was essentially normal. negative for PUD or H pylori. Today she returns and is feeling well. She thinks some of her upper Gi sx are anxiety related. Things are somewhat better now. She does not want to change from Aciphex at this point. She is having some constipation. She is using prn Dulcolax. Miralax causedbloating. her weight is stable. 8/21/19 Bettye returns for follow up/for a new issue. She reports that she has had longstanding issues with mild constipation. She has taken dulcolax for this. Now she is requiring addition of miralax and she does notice some increasing bloating and gas with miralax. She had also had worsening loose stools after starting miralax sos she has had to cut back. She is wondering if there are other options. She is not having any blood in her stools. She has a decreased appetite and some foods taste different to her. She denies any otehr abdominal sx. Her upper GI symptoms seem much improved since we saw her in 2017   1/26/2021 Ms. Bettye Coto is here for nausea, loss of appetite, reflux, and constipation. She was last seen for reflux in 2017 with a normal EGD. She has been on aciphex and her symptoms were thought to be anxiety driven. She started having nausea and loss of appetite just before [place]. Foods just did not taste good. She admits to having a lot of stress at that time.  She was seen in 2019 for constipation. She had a colonoscopy that was normal except for a looping colon. She has been on miralax 0.5 cap a day. She has had trouble regulating her bowels. If she takes too much miralax she will have diarrhea and if too little she will have hard balls. She is having a lot of gas and rumbling. She has seen a small amount of blood when she wipes. She has a hx of internal hemorrhoids. CS  2/16/2021 Ms. Bettye Coto is here for nausea, loss of appetite, reflux, and constipation. She was last seen for reflux in 2017 with a normal EGD. She has been on aciphex and her symptoms were thought to be anxiety driven. She started having nausea and loss of appetite just before [place]. Foods just did not taste good. She was changed from aciphex to protonix 40mg BID. After 2 weeks, her reflux got worse. She started having a lot of regurgitation and worse nausea. She called the office and was changed back to aciphex and given AMT. She took two doses and stopped. She was having severe dry mouth and sedation.  She had a colonoscopy in 2019 that was normal except for a looping colon. She has been working on finding the right dose of miralax and this is working well. CS   4/1/2021 Ms. Bettye Coto is here for f/u of nausea, loss of appetite, reflux, and constipation. She had been on aciphex and doing well until just before [place]. She started having nausea and loss of appetite. She was changed from aciphex to protonix 40mg BID. After 2 weeks, her reflux got worse with regurgitation and nausea. She was changed back to aciphex and AMT was started. She took two doses  of the AMT and stopped due to severe dry mouth and sedation. Her EGD was repeated with mild gastritis. Today, she feels her reflux is better with back on the aciphex. She is still having some nasuea nad scared to eat. Her weight is down a few more pounds. She wonders if gluten is the issue. She has not been able to tolerate Buspar in the past. She is seeing her PCP later this month and will talk to him about her stress/anxiety. CS   7/1/2021 Ms. Bettye Coto is here for f/u of nausea, loss of appetite, reflux, and constipation. Since her last OV, her reflux and nausea had been well controlled. Her appetite had also improved. She had only one episode of nausea after eating steak and peppers.  Her constipation is well controlled with fiber and miralax. The miralax every 3-4 weeks works a little too well and causes diarrhea. CS   12/30/2021 Ms. Bettye Coto is here for f/u of nausea, loss of appetite, reflux, and constipation. Today, her reflux is well controlled. She denies any return of nausea or loss of appetite. Her main complaint today is change in stool and fecal incontinence. She is usually constipated and takes fiber and miralax prn. She was started on methotrexate injection and has been under a lot of stress during this holiday season. She is having sticky stools after she eats that is hard to get all out and has had some fecal incontinence. She took half a AMT and it helped. It just made her so sleepy- she did take it during the day. She wonders if she should stop the methotrexate.  She also has started having bladder incontinence and urology told her that her bladder had dropped. CS   1/14/2022 Ms. Bettye Coto is evaluated via telehealth for f/u of bowel habit change. At her last OV, she denies any reflux or return of nausea or loss of appetite. She was having sticky stools after eating that was hard to get all out with fecal incontinence. She was told to continue fiber, hold the miralax, and given bentyl. She took bentyl for one day and the BM after eating stopped. She was then back to constipated. She has added back miralax. She is back to having gas and bloating and trouble getting the right miralax dose. CS  3/11/2022 Ms. Bettye Coto is evaluated via telehealth for f/u of bowel habit change. She is taking aciphex and her reflux is well controlled. She is taking the miralax and her bowels are now normal. If she gets a little backed up she will add gasx but this is rare. Overall, she is doing really well from a GI standpoint. She may need bladder surgery soon. CS  9/7/2022 Ms. Bettye Coto is here for f/u of GERD, IBS-C, and hospital f/u of SBO. Since her last OV, she has a hysterectomy and pelvic floor surgery by Dr Jett in June. A month later, she started having nausea and diarrhea. She was feeling really weak and dehydrated so she went to the ER. She was found to have a SBO. She had an NGT. After about 7 days, she had a PICC line and TPN. ON day 8, her NGT was removed and her diet was advanced. She did not require surgery. Since ariving home, she has had a lot of gas, bloating, and incomplete BM. She feels constipated but can not have a BM or only a small amount comes out. She is taking fiber daily and miralax prn.  Her  was dx with dementia and this has increased her anxiety and stress level. CS  1/25/2023 Ms. Bettye Coto is here for f/u of GERD, IBS-C, and SBO. Last summer, she had a hysterectomy and pelvic floor surgery by Dr Jett in June.  She started having nausea and diarrhea and found to have a SBO. This was felt to be surgery related. After the SBO, she had a lot of gas, bloating, and incomplete BM. She was given a round of xifaxan and her bloating and gas improved until she went to a holiday party in December. She was given a round of flagyl and this helped. Today, her main issue is constipation. The miralax takes a week to work and then works too well. She has been eating very little veggies. Nutrition supplements have helped and her weight is up.  Her  was dx with dementia and this has increased her anxiety and stress level. CS  5/22/2023 Ms. Bettye Coto is here for f/u of GERD, IBS-C, and SBO. Last summer, she had a hysterectomy and pelvic floor surgery by Dr Jett in June.  She started having nausea and diarrhea and found to have a SBO. It has taken some time but she is finally starting to feel better. Her weight is stable. She denies any further gas or bloating. She is taking fiber at night and playing with the miralax dose. She has not been able to find the right amount to keep her regular without diarrhea. She is having to wipe a lot and now has a fissure that is painful. CS  9/5/2023 Ms. 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 Her reflux is well controlled with aciphex. She has needed the pepcid. She just got her motegrity a week ago. She has been using dulcolax and miralax since her last OV. She feels full and has not had a BM in 4 days since starting the motegrity. She denies any nausea or vomiting. She has not needed any zofran. Her fissure is better with the ointment. CS  12/5/2023 Ms. 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 Her reflux is well controlled with aciphex. She started the motegrity and it was doing well until 11/13. She started going too much and was having a lot of cramping from it. She was changed to EOD but then got constipated. She has had to add miralax again. The ibsrela does help when she gets super backed up. She denies any nausea or vomiting. CS

## 2024-03-22 NOTE — PHYSICAL EXAM CONSTITUTIONAL:
well developed, well nourished , in no acute distress , ambulating without difficulty , normal communication ability
Detail Level: Detailed
Depth Of Biopsy: dermis
Was A Bandage Applied: Yes
Size Of Lesion In Cm: 0
Biopsy Type: H and E
Biopsy Method: Dermablade
Anesthesia Type: 1% lidocaine with epinephrine
Anesthesia Volume In Cc: 0.5
Hemostasis: Manisha's
Wound Care: Vaseline
Dressing: bandage
Destruction After The Procedure: No
Type Of Destruction Used: Curettage
Curettage Text: The wound bed was treated with curettage after the biopsy was performed.
Cryotherapy Text: The wound bed was treated with cryotherapy after the biopsy was performed.
Electrodesiccation Text: The wound bed was treated with electrodesiccation after the biopsy was performed.
Electrodesiccation And Curettage Text: The wound bed was treated with electrodesiccation and curettage after the biopsy was performed.
Silver Nitrate Text: The wound bed was treated with silver nitrate after the biopsy was performed.
Lab: [id number]
Lab Facility: [id number]
Consent: Written consent was obtained and risks were reviewed including but not limited to scarring, infection, bleeding, scabbing, incomplete removal, nerve damage and allergy to anesthesia.
Post-Care Instructions: I reviewed with the patient in detail post-care instructions. Patient is to keep the biopsy site dry overnight, and then apply bacitracin twice daily until healed. Patient may apply hydrogen peroxide soaks to remove any crusting.
Notification Instructions: Patient will be notified of biopsy results. However, patient instructed to call the office if not contacted within 2 weeks.
Billing Type: Third-Party Bill
Information: Selecting Yes will display possible errors in your note based on the variables you have selected. This validation is only offered as a suggestion for you. PLEASE NOTE THAT THE VALIDATION TEXT WILL BE REMOVED WHEN YOU FINALIZE YOUR NOTE. IF YOU WANT TO FAX A PRELIMINARY NOTE YOU WILL NEED TO TOGGLE THIS TO 'NO' IF YOU DO NOT WANT IT IN YOUR FAXED NOTE.

## 2024-05-07 ENCOUNTER — OFFICE VISIT (OUTPATIENT)
Dept: URBAN - NONMETROPOLITAN AREA CLINIC 13 | Facility: CLINIC | Age: 74
End: 2024-05-07
Payer: MEDICARE

## 2024-05-07 ENCOUNTER — DASHBOARD ENCOUNTERS (OUTPATIENT)
Age: 74
End: 2024-05-07

## 2024-05-07 VITALS
BODY MASS INDEX: 21.86 KG/M2 | HEIGHT: 66 IN | SYSTOLIC BLOOD PRESSURE: 137 MMHG | DIASTOLIC BLOOD PRESSURE: 82 MMHG | HEART RATE: 72 BPM | WEIGHT: 136 LBS

## 2024-05-07 DIAGNOSIS — K21.9 GERD: ICD-10-CM

## 2024-05-07 DIAGNOSIS — K59.09 OTHER CONSTIPATION: ICD-10-CM

## 2024-05-07 PROCEDURE — 99213 OFFICE O/P EST LOW 20 MIN: CPT | Performed by: NURSE PRACTITIONER

## 2024-05-07 RX ORDER — CHOLECALCIFEROL (VITAMIN D3) 50 MCG
1 CAPSULE CAPSULE ORAL ONCE A DAY
Status: ACTIVE | COMMUNITY

## 2024-05-07 RX ORDER — RABEPRAZOLE SODIUM 20 MG/1
TAKE 1 TABLET (20 MG) BY ORAL ROUTE ONCE DAILY SWALLOWING WHOLE. DO NOT CRUSH, CHEW AND/OR DIVIDE TABLET, DELAYED RELEASE ORAL 1
Qty: 90 | Refills: 3 | Status: ACTIVE | COMMUNITY

## 2024-05-07 RX ORDER — METRONIDAZOLE 500 MG/1
1 TABLET TABLET, FILM COATED ORAL THREE TIMES A DAY
Qty: 42 TABLET | Refills: 1
Start: 2023-01-12 | End: 2024-06-04

## 2024-05-07 RX ORDER — POLYETHYLENE GLYCOL 3350 17 G/17G
AS DIRECTED POWDER, FOR SOLUTION ORAL
Status: ACTIVE | COMMUNITY

## 2024-05-07 RX ORDER — PRUCALOPRIDE 2 MG/1
1 TABLET TABLET, FILM COATED ORAL ONCE A DAY
Qty: 90 TABLET | Refills: 3 | OUTPATIENT

## 2024-05-07 RX ORDER — ADALIMUMAB 40MG/0.8ML
INJECT 0.8 MILLILITER (40 MG) BY SUBCUTANEOUS ROUTE EVERY 2 WEEKS KIT SUBCUTANEOUS
Qty: 1 | Refills: 0 | Status: ACTIVE | COMMUNITY
Start: 1900-01-01

## 2024-05-07 RX ORDER — AMLODIPINE BESYLATE AND BENAZEPRIL HYDROCHLORIDE 5; 20 MG/1; MG/1
AS DIRECTED CAPSULE ORAL
Status: ACTIVE | COMMUNITY

## 2024-05-07 RX ORDER — PRUCALOPRIDE 2 MG/1
1 TABLET TABLET, FILM COATED ORAL ONCE A DAY
Qty: 90 TABLET | Refills: 3 | Status: ACTIVE | COMMUNITY

## 2024-05-07 RX ORDER — DOCUSATE SODIUM 100 MG/1
1 CAPSULE AS NEEDED CAPSULE ORAL ONCE A DAY
Status: ACTIVE | COMMUNITY

## 2024-05-07 RX ORDER — DICYCLOMINE HYDROCHLORIDE 10 MG/1
1 TABLET AS NEEDED FOR CRAMPING/DIARRHEA CAPSULE ORAL THREE TIMES A DAY
Qty: 90 TABLET | Refills: 6 | Status: ACTIVE | COMMUNITY

## 2024-05-07 RX ORDER — ESTRADIOL 10 UG/1
_INSERT 1 TABLET (10 MCG) BY VAGINAL ROUTE TWICE WEEKLY INSERT VAGINAL
Qty: 1 | Refills: 0 | Status: ACTIVE | COMMUNITY
Start: 1900-01-01

## 2024-05-07 RX ORDER — FAMOTIDINE 20 MG/1
1 TABLET AT BEDTIME AS NEEDED TABLET, FILM COATED ORAL ONCE A DAY
Qty: 90 TABLET | Refills: 11 | OUTPATIENT

## 2024-05-07 RX ORDER — EPINASTINE HYDROCHLORIDE 0.5 MG/ML
1 DROP INTO AFFECTED EYE SOLUTION/ DROPS OPHTHALMIC TWICE A DAY
Status: ACTIVE | COMMUNITY

## 2024-05-07 RX ORDER — TRAMADOL HYDROCHLORIDE 50 MG/1
TAKE 1 TABLET (50 MG) BY ORAL ROUTE EVERY 6 HOURS AS NEEDED TABLET ORAL
Qty: 0 | Refills: 0 | Status: ACTIVE | COMMUNITY
Start: 1900-01-01

## 2024-05-07 RX ORDER — METHENAMINE HIPPURATE 1000 MG/1
1 TABLET TABLET ORAL TWICE A DAY
Status: ACTIVE | COMMUNITY

## 2024-05-07 RX ORDER — TRAZODONE HYDROCHLORIDE 50 MG/1
1 TABLET AT BEDTIME AS NEEDED TABLET ORAL ONCE A DAY
Status: ACTIVE | COMMUNITY

## 2024-05-07 RX ORDER — ONDANSETRON HYDROCHLORIDE 4 MG/1
1 TABLET EVERY 6-8HRS AS NEEDED FOR NAUSEA TABLET, FILM COATED ORAL
Qty: 30 | Refills: 3 | Status: ACTIVE | COMMUNITY

## 2024-05-07 RX ORDER — CLONAZEPAM 0.5 MG/1
TAKE 1 TABLET BY ORAL ROUTE ONCE A DAY (AT BEDTIME) TABLET ORAL 1
Qty: 0 | Refills: 0 | Status: ON HOLD | COMMUNITY
Start: 1900-01-01

## 2024-05-07 RX ORDER — ONDANSETRON HYDROCHLORIDE 4 MG/1
1 TABLET EVERY 6-8HRS AS NEEDED FOR NAUSEA TABLET, FILM COATED ORAL
Qty: 30 | Refills: 3 | OUTPATIENT

## 2024-05-07 RX ORDER — RABEPRAZOLE SODIUM 20 MG/1
TAKE 1 TABLET (20 MG) BY ORAL ROUTE ONCE DAILY SWALLOWING WHOLE. DO NOT CRUSH, CHEW AND/OR DIVIDE TABLET, DELAYED RELEASE ORAL 1
Qty: 90 | Refills: 3

## 2024-05-07 RX ORDER — FLUCONAZOLE 150 MG/1
1 TABLET TABLET ORAL
Qty: 2 | Refills: 1
Start: 2023-01-12 | End: 2024-05-13

## 2024-05-07 RX ORDER — DICLOFENAC SODIUM 10 MG/G
APPLY 2 GRAM TO THE AFFECTED AREA(S) BY TOPICAL ROUTE 4 TIMES PER DAY GEL TOPICAL
Qty: 1 | Refills: 0 | Status: ACTIVE | COMMUNITY
Start: 1900-01-01

## 2024-05-07 RX ORDER — FAMOTIDINE 20 MG/1
1 TABLET AT BEDTIME AS NEEDED TABLET, FILM COATED ORAL ONCE A DAY
Qty: 90 TABLET | Refills: 11 | Status: ACTIVE | COMMUNITY

## 2024-08-08 ENCOUNTER — OFFICE VISIT (OUTPATIENT)
Dept: URBAN - NONMETROPOLITAN AREA CLINIC 13 | Facility: CLINIC | Age: 74
End: 2024-08-08
Payer: MEDICARE

## 2024-08-08 VITALS
DIASTOLIC BLOOD PRESSURE: 73 MMHG | BODY MASS INDEX: 20.73 KG/M2 | WEIGHT: 129 LBS | SYSTOLIC BLOOD PRESSURE: 145 MMHG | HEIGHT: 66 IN | HEART RATE: 75 BPM

## 2024-08-08 DIAGNOSIS — R11.0 NAUSEA: ICD-10-CM

## 2024-08-08 DIAGNOSIS — K59.09 CONSTIPATION: ICD-10-CM

## 2024-08-08 DIAGNOSIS — K21.9 GASTRO-ESOPHAGEAL REFLUX DISEASE WITHOUT ESOPHAGITIS: ICD-10-CM

## 2024-08-08 PROCEDURE — 99213 OFFICE O/P EST LOW 20 MIN: CPT | Performed by: NURSE PRACTITIONER

## 2024-08-08 RX ORDER — POLYETHYLENE GLYCOL 3350 17 G/17G
AS DIRECTED POWDER, FOR SOLUTION ORAL
Status: ACTIVE | COMMUNITY

## 2024-08-08 RX ORDER — DICLOFENAC SODIUM 10 MG/G
APPLY 2 GRAM TO THE AFFECTED AREA(S) BY TOPICAL ROUTE 4 TIMES PER DAY GEL TOPICAL
Qty: 1 | Refills: 0 | Status: ACTIVE | COMMUNITY
Start: 1900-01-01

## 2024-08-08 RX ORDER — RABEPRAZOLE SODIUM 20 MG/1
TAKE 1 TABLET (20 MG) BY ORAL ROUTE ONCE DAILY SWALLOWING WHOLE. DO NOT CRUSH, CHEW AND/OR DIVIDE TABLET, DELAYED RELEASE ORAL 1
Qty: 90 | Refills: 3

## 2024-08-08 RX ORDER — FAMOTIDINE 20 MG/1
1 TABLET AT BEDTIME AS NEEDED TABLET, FILM COATED ORAL ONCE A DAY
Qty: 90 TABLET | Refills: 11 | Status: ACTIVE | COMMUNITY

## 2024-08-08 RX ORDER — TRAMADOL HYDROCHLORIDE 50 MG/1
TAKE 1 TABLET (50 MG) BY ORAL ROUTE EVERY 6 HOURS AS NEEDED TABLET ORAL
Qty: 0 | Refills: 0 | Status: ACTIVE | COMMUNITY
Start: 1900-01-01

## 2024-08-08 RX ORDER — ESTRADIOL 10 UG/1
_INSERT 1 TABLET (10 MCG) BY VAGINAL ROUTE TWICE WEEKLY INSERT VAGINAL
Qty: 1 | Refills: 0 | Status: ACTIVE | COMMUNITY
Start: 1900-01-01

## 2024-08-08 RX ORDER — TENAPANOR HYDROCHLORIDE 53.2 MG/1
1 TABLET IMMEDIATELY BEFORE MEALS TABLET ORAL TWICE A DAY
Qty: 180 TABLET | Refills: 3 | OUTPATIENT
Start: 2024-08-08 | End: 2025-08-03

## 2024-08-08 RX ORDER — DICYCLOMINE HYDROCHLORIDE 10 MG/1
1 TABLET AS NEEDED FOR CRAMPING/DIARRHEA CAPSULE ORAL THREE TIMES A DAY
Qty: 90 TABLET | Refills: 6 | Status: ACTIVE | COMMUNITY

## 2024-08-08 RX ORDER — CLONAZEPAM 0.5 MG/1
TAKE 1 TABLET BY ORAL ROUTE ONCE A DAY (AT BEDTIME) TABLET ORAL 1
Qty: 0 | Refills: 0 | Status: ON HOLD | COMMUNITY
Start: 1900-01-01

## 2024-08-08 RX ORDER — BUSPIRONE HYDROCHLORIDE 7.5 MG/1
1 TABLET TABLET ORAL TWICE A DAY
Qty: 60 TABLET | Refills: 5 | OUTPATIENT
Start: 2024-08-08 | End: 2025-02-03

## 2024-08-08 RX ORDER — ONDANSETRON HYDROCHLORIDE 4 MG/1
1 TABLET EVERY 6-8HRS AS NEEDED FOR NAUSEA TABLET, FILM COATED ORAL
Qty: 30 | Refills: 3 | Status: ACTIVE | COMMUNITY

## 2024-08-08 RX ORDER — FAMOTIDINE 20 MG/1
1 TABLET AT BEDTIME AS NEEDED TABLET, FILM COATED ORAL ONCE A DAY
Qty: 90 TABLET | Refills: 11 | OUTPATIENT

## 2024-08-08 RX ORDER — AMLODIPINE BESYLATE AND BENAZEPRIL HYDROCHLORIDE 5; 20 MG/1; MG/1
AS DIRECTED CAPSULE ORAL
Status: ACTIVE | COMMUNITY

## 2024-08-08 RX ORDER — PRUCALOPRIDE 2 MG/1
1 TABLET TABLET, FILM COATED ORAL ONCE A DAY
Qty: 90 TABLET | Refills: 3 | Status: ACTIVE | COMMUNITY

## 2024-08-08 RX ORDER — METHENAMINE HIPPURATE 1000 MG/1
1 TABLET TABLET ORAL TWICE A DAY
Status: ACTIVE | COMMUNITY

## 2024-08-08 RX ORDER — PRUCALOPRIDE 2 MG/1
1 TABLET TABLET, FILM COATED ORAL ONCE A DAY
Qty: 90 TABLET | Refills: 3 | OUTPATIENT

## 2024-08-08 RX ORDER — CHOLECALCIFEROL (VITAMIN D3) 50 MCG
1 CAPSULE CAPSULE ORAL ONCE A DAY
Status: ACTIVE | COMMUNITY

## 2024-08-08 RX ORDER — DOCUSATE SODIUM 100 MG/1
1 CAPSULE AS NEEDED CAPSULE ORAL ONCE A DAY
Status: ACTIVE | COMMUNITY

## 2024-08-08 RX ORDER — ONDANSETRON HYDROCHLORIDE 4 MG/1
1 TABLET EVERY 6-8HRS AS NEEDED FOR NAUSEA TABLET, FILM COATED ORAL
Qty: 30 | Refills: 3 | OUTPATIENT

## 2024-08-08 RX ORDER — TRAZODONE HYDROCHLORIDE 50 MG/1
1 TABLET AT BEDTIME AS NEEDED TABLET ORAL ONCE A DAY
Status: ACTIVE | COMMUNITY

## 2024-08-08 RX ORDER — EPINASTINE HYDROCHLORIDE 0.5 MG/ML
1 DROP INTO AFFECTED EYE SOLUTION/ DROPS OPHTHALMIC TWICE A DAY
Status: ACTIVE | COMMUNITY

## 2024-08-08 RX ORDER — ADALIMUMAB 40MG/0.8ML
INJECT 0.8 MILLILITER (40 MG) BY SUBCUTANEOUS ROUTE EVERY 2 WEEKS KIT SUBCUTANEOUS
Qty: 1 | Refills: 0 | Status: ACTIVE | COMMUNITY
Start: 1900-01-01

## 2024-08-08 RX ORDER — RABEPRAZOLE SODIUM 20 MG/1
TAKE 1 TABLET (20 MG) BY ORAL ROUTE ONCE DAILY SWALLOWING WHOLE. DO NOT CRUSH, CHEW AND/OR DIVIDE TABLET, DELAYED RELEASE ORAL 1
Qty: 90 | Refills: 3 | Status: ACTIVE | COMMUNITY

## 2024-08-08 NOTE — HPI-OTHER HISTORIES
8/21/2019 Bettye is a new patient referred for gastritis. She states that she has had issues with dysphagia over the last few months. She has long standing reflux and has been on aciphex. More recently she feels like this is not as effective. She has solid food dysphagia. She has not had any liquid food dysphagia. She has a history of candidal esophagitis in the past while she is on prednisone. She is on Humira now for RA EGD was essentially normal. negative for PUD or H pylori. Today she returns and is feeling well. She thinks some of her upper Gi sx are anxiety related. Things are somewhat better now. She does not want to change from Aciphex at this point. She is having some constipation. She is using prn Dulcolax. Miralax causedbloating. her weight is stable. 8/21/19 Bettye returns for follow up/for a new issue. She reports that she has had longstanding issues with mild constipation. She has taken dulcolax for this. Now she is requiring addition of miralax and she does notice some increasing bloating and gas with miralax. She had also had worsening loose stools after starting miralax sos she has had to cut back. She is wondering if there are other options. She is not having any blood in her stools. She has a decreased appetite and some foods taste different to her. She denies any otehr abdominal sx. Her upper GI symptoms seem much improved since we saw her in 2017   1/26/2021 Ms. Bettye Coto is here for nausea, loss of appetite, reflux, and constipation. She was last seen for reflux in 2017 with a normal EGD. She has been on aciphex and her symptoms were thought to be anxiety driven. She started having nausea and loss of appetite just before [place]. Foods just did not taste good. She admits to having a lot of stress at that time.  She was seen in 2019 for constipation. She had a colonoscopy that was normal except for a looping colon. She has been on miralax 0.5 cap a day. She has had trouble regulating her bowels. If she takes too much miralax she will have diarrhea and if too little she will have hard balls. She is having a lot of gas and rumbling. She has seen a small amount of blood when she wipes. She has a hx of internal hemorrhoids. CS  2/16/2021 Ms. Bettye Coto is here for nausea, loss of appetite, reflux, and constipation. She was last seen for reflux in 2017 with a normal EGD. She has been on aciphex and her symptoms were thought to be anxiety driven. She started having nausea and loss of appetite just before [place]. Foods just did not taste good. She was changed from aciphex to protonix 40mg BID. After 2 weeks, her reflux got worse. She started having a lot of regurgitation and worse nausea. She called the office and was changed back to aciphex and given AMT. She took two doses and stopped. She was having severe dry mouth and sedation.  She had a colonoscopy in 2019 that was normal except for a looping colon. She has been working on finding the right dose of miralax and this is working well. CS   4/1/2021 Ms. Bettye Coto is here for f/u of nausea, loss of appetite, reflux, and constipation. She had been on aciphex and doing well until just before [place]. She started having nausea and loss of appetite. She was changed from aciphex to protonix 40mg BID. After 2 weeks, her reflux got worse with regurgitation and nausea. She was changed back to aciphex and AMT was started. She took two doses  of the AMT and stopped due to severe dry mouth and sedation. Her EGD was repeated with mild gastritis. Today, she feels her reflux is better with back on the aciphex. She is still having some nasuea nad scared to eat. Her weight is down a few more pounds. She wonders if gluten is the issue. She has not been able to tolerate Buspar in the past. She is seeing her PCP later this month and will talk to him about her stress/anxiety. CS   7/1/2021 Ms. Bettye Coto is here for f/u of nausea, loss of appetite, reflux, and constipation. Since her last OV, her reflux and nausea had been well controlled. Her appetite had also improved. She had only one episode of nausea after eating steak and peppers.  Her constipation is well controlled with fiber and miralax. The miralax every 3-4 weeks works a little too well and causes diarrhea. CS   12/30/2021 Ms. Bettye Coto is here for f/u of nausea, loss of appetite, reflux, and constipation. Today, her reflux is well controlled. She denies any return of nausea or loss of appetite. Her main complaint today is change in stool and fecal incontinence. She is usually constipated and takes fiber and miralax prn. She was started on methotrexate injection and has been under a lot of stress during this holiday season. She is having sticky stools after she eats that is hard to get all out and has had some fecal incontinence. She took half a AMT and it helped. It just made her so sleepy- she did take it during the day. She wonders if she should stop the methotrexate.  She also has started having bladder incontinence and urology told her that her bladder had dropped. CS   1/14/2022 Ms. Bettye Coto is evaluated via telehealth for f/u of bowel habit change. At her last OV, she denies any reflux or return of nausea or loss of appetite. She was having sticky stools after eating that was hard to get all out with fecal incontinence. She was told to continue fiber, hold the miralax, and given bentyl. She took bentyl for one day and the BM after eating stopped. She was then back to constipated. She has added back miralax. She is back to having gas and bloating and trouble getting the right miralax dose. CS  3/11/2022 Ms. Bettye Coto is evaluated via telehealth for f/u of bowel habit change. She is taking aciphex and her reflux is well controlled. She is taking the miralax and her bowels are now normal. If she gets a little backed up she will add gasx but this is rare. Overall, she is doing really well from a GI standpoint. She may need bladder surgery soon. CS  9/7/2022 Ms. Bettye Coto is here for f/u of GERD, IBS-C, and hospital f/u of SBO. Since her last OV, she has a hysterectomy and pelvic floor surgery by Dr Jett in June. A month later, she started having nausea and diarrhea. She was feeling really weak and dehydrated so she went to the ER. She was found to have a SBO. She had an NGT. After about 7 days, she had a PICC line and TPN. ON day 8, her NGT was removed and her diet was advanced. She did not require surgery. Since ariving home, she has had a lot of gas, bloating, and incomplete BM. She feels constipated but can not have a BM or only a small amount comes out. She is taking fiber daily and miralax prn.  Her  was dx with dementia and this has increased her anxiety and stress level. CS  1/25/2023 Ms. Bettye Coto is here for f/u of GERD, IBS-C, and SBO. Last summer, she had a hysterectomy and pelvic floor surgery by Dr Jett in June.  She started having nausea and diarrhea and found to have a SBO. This was felt to be surgery related. After the SBO, she had a lot of gas, bloating, and incomplete BM. She was given a round of xifaxan and her bloating and gas improved until she went to a holiday party in December. She was given a round of flagyl and this helped. Today, her main issue is constipation. The miralax takes a week to work and then works too well. She has been eating very little veggies. Nutrition supplements have helped and her weight is up.  Her  was dx with dementia and this has increased her anxiety and stress level. CS  5/22/2023 Ms. Bettye Coto is here for f/u of GERD, IBS-C, and SBO. Last summer, she had a hysterectomy and pelvic floor surgery by Dr Jett in June.  She started having nausea and diarrhea and found to have a SBO. It has taken some time but she is finally starting to feel better. Her weight is stable. She denies any further gas or bloating. She is taking fiber at night and playing with the miralax dose. She has not been able to find the right amount to keep her regular without diarrhea. She is having to wipe a lot and now has a fissure that is painful. CS  9/5/2023 Ms. 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 Her reflux is well controlled with aciphex. She has needed the pepcid. She just got her motegrity a week ago. She has been using dulcolax and miralax since her last OV. She feels full and has not had a BM in 4 days since starting the motegrity. She denies any nausea or vomiting. She has not needed any zofran. Her fissure is better with the ointment. CS  12/5/2023 Ms. 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 Her reflux is well controlled with aciphex. She started the motegrity and it was doing well until 11/13. She started going too much and was having a lot of cramping from it. She was changed to EOD but then got constipated. She has had to add miralax again. The ibsrela does help when she gets super backed up. She denies any nausea or vomiting. CS  2/6/2024 Ms. 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 Her reflux is well controlled with aciphex. She has not been able to tolerate the motegrity it never let her know when she would have a BM. She started having nasuea, reflux, and diarrhea. She started a round of flagyl and this is helping. She has not been able to get her bowels on a schedule. She continues to have a lot of stress with her 's dementia. CS  5/7/2024 Ms. 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 Her reflux is doing well. She is no longer taking aciphex or pepcid. Her BM improved with pelvic floor therapy. She has had to cancel a few sessions and had more stress with selling her home and her constipation is back. She is taking miralax and dulcolax prn. She had some bleeding but this was minimal. Overall, she is feeling ok. She continues to have a lot of stress with her 's dementia. She is moving to [facility]. CS

## 2024-08-09 ENCOUNTER — TELEPHONE ENCOUNTER (OUTPATIENT)
Dept: URBAN - NONMETROPOLITAN AREA CLINIC 13 | Facility: CLINIC | Age: 74
End: 2024-08-09

## 2024-08-19 ENCOUNTER — TELEPHONE ENCOUNTER (OUTPATIENT)
Dept: URBAN - NONMETROPOLITAN AREA CLINIC 2 | Facility: CLINIC | Age: 74
End: 2024-08-19

## 2024-10-04 ENCOUNTER — OFFICE VISIT (OUTPATIENT)
Dept: URBAN - METROPOLITAN AREA TELEHEALTH 2 | Facility: TELEHEALTH | Age: 74
End: 2024-10-04

## 2024-10-04 RX ORDER — TENAPANOR HYDROCHLORIDE 53.2 MG/1
1 TABLET IMMEDIATELY BEFORE MEALS TABLET ORAL TWICE A DAY
Qty: 180 TABLET | Refills: 3 | OUTPATIENT

## 2024-10-04 RX ORDER — BUSPIRONE HYDROCHLORIDE 7.5 MG/1
1 TABLET TABLET ORAL TWICE A DAY
Qty: 60 TABLET | Refills: 5 | OUTPATIENT

## 2024-10-04 RX ORDER — POLYETHYLENE GLYCOL 3350 17 G/17G
AS DIRECTED POWDER, FOR SOLUTION ORAL
Status: ACTIVE | COMMUNITY

## 2024-10-04 RX ORDER — BUSPIRONE HYDROCHLORIDE 7.5 MG/1
1 TABLET TABLET ORAL TWICE A DAY
Qty: 60 TABLET | Refills: 5 | Status: ACTIVE | COMMUNITY
Start: 2024-08-08 | End: 2025-02-03

## 2024-10-04 RX ORDER — PRUCALOPRIDE 2 MG/1
1 TABLET TABLET, FILM COATED ORAL ONCE A DAY
Qty: 90 TABLET | Refills: 3 | Status: ACTIVE | COMMUNITY

## 2024-10-04 RX ORDER — FAMOTIDINE 20 MG/1
1 TABLET AT BEDTIME AS NEEDED TABLET, FILM COATED ORAL ONCE A DAY
Qty: 90 TABLET | Refills: 11 | OUTPATIENT

## 2024-10-04 RX ORDER — ESTRADIOL 10 UG/1
_INSERT 1 TABLET (10 MCG) BY VAGINAL ROUTE TWICE WEEKLY INSERT VAGINAL
Qty: 1 | Refills: 0 | Status: ACTIVE | COMMUNITY
Start: 1900-01-01

## 2024-10-04 RX ORDER — CHOLECALCIFEROL (VITAMIN D3) 50 MCG
1 CAPSULE CAPSULE ORAL ONCE A DAY
Status: ACTIVE | COMMUNITY

## 2024-10-04 RX ORDER — RABEPRAZOLE SODIUM 20 MG/1
TAKE 1 TABLET (20 MG) BY ORAL ROUTE ONCE DAILY SWALLOWING WHOLE. DO NOT CRUSH, CHEW AND/OR DIVIDE TABLET, DELAYED RELEASE ORAL 1
Qty: 90 | Refills: 3 | Status: ACTIVE | COMMUNITY

## 2024-10-04 RX ORDER — TENAPANOR HYDROCHLORIDE 53.2 MG/1
1 TABLET IMMEDIATELY BEFORE MEALS TABLET ORAL TWICE A DAY
Qty: 180 TABLET | Refills: 3 | Status: ACTIVE | COMMUNITY
Start: 2024-08-08 | End: 2025-08-03

## 2024-10-04 RX ORDER — ONDANSETRON HYDROCHLORIDE 4 MG/1
1 TABLET EVERY 6-8HRS AS NEEDED FOR NAUSEA TABLET, FILM COATED ORAL
Qty: 30 | Refills: 3 | OUTPATIENT

## 2024-10-04 RX ORDER — TRAMADOL HYDROCHLORIDE 50 MG/1
TAKE 1 TABLET (50 MG) BY ORAL ROUTE EVERY 6 HOURS AS NEEDED TABLET, FILM COATED ORAL
Qty: 0 | Refills: 0 | Status: ACTIVE | COMMUNITY
Start: 1900-01-01

## 2024-10-04 RX ORDER — DICLOFENAC SODIUM 10 MG/G
APPLY 2 GRAM TO THE AFFECTED AREA(S) BY TOPICAL ROUTE 4 TIMES PER DAY GEL TOPICAL
Qty: 1 | Refills: 0 | Status: ACTIVE | COMMUNITY
Start: 1900-01-01

## 2024-10-04 RX ORDER — PRUCALOPRIDE 2 MG/1
1 TABLET TABLET, FILM COATED ORAL ONCE A DAY
Qty: 90 TABLET | Refills: 3 | OUTPATIENT

## 2024-10-04 RX ORDER — DOCUSATE SODIUM 100 MG/1
1 CAPSULE AS NEEDED CAPSULE ORAL ONCE A DAY
Status: ACTIVE | COMMUNITY

## 2024-10-04 RX ORDER — RABEPRAZOLE SODIUM 20 MG/1
TAKE 1 TABLET (20 MG) BY ORAL ROUTE ONCE DAILY SWALLOWING WHOLE. DO NOT CRUSH, CHEW AND/OR DIVIDE TABLET, DELAYED RELEASE ORAL 1
Qty: 90 | Refills: 3

## 2024-10-04 RX ORDER — EPINASTINE HYDROCHLORIDE 0.5 MG/ML
1 DROP INTO AFFECTED EYE SOLUTION/ DROPS OPHTHALMIC TWICE A DAY
Status: ACTIVE | COMMUNITY

## 2024-10-04 RX ORDER — ADALIMUMAB 40MG/0.8ML
INJECT 0.8 MILLILITER (40 MG) BY SUBCUTANEOUS ROUTE EVERY 2 WEEKS KIT SUBCUTANEOUS
Qty: 1 | Refills: 0 | Status: ACTIVE | COMMUNITY
Start: 1900-01-01

## 2024-10-04 RX ORDER — CLONAZEPAM 0.5 MG/1
TAKE 1 TABLET BY ORAL ROUTE ONCE A DAY (AT BEDTIME) TABLET ORAL 1
Qty: 0 | Refills: 0 | Status: ON HOLD | COMMUNITY
Start: 1900-01-01

## 2024-10-04 RX ORDER — TRAZODONE HYDROCHLORIDE 50 MG/1
1 TABLET AT BEDTIME AS NEEDED TABLET ORAL ONCE A DAY
Status: ACTIVE | COMMUNITY

## 2024-10-04 RX ORDER — METHENAMINE HIPPURATE 1000 MG/1
1 TABLET TABLET ORAL TWICE A DAY
Status: ACTIVE | COMMUNITY

## 2024-10-04 RX ORDER — DICYCLOMINE HYDROCHLORIDE 10 MG/1
1 TABLET AS NEEDED FOR CRAMPING/DIARRHEA CAPSULE ORAL THREE TIMES A DAY
Qty: 90 TABLET | Refills: 6 | Status: ACTIVE | COMMUNITY

## 2024-10-04 RX ORDER — AMLODIPINE BESYLATE AND BENAZEPRIL HYDROCHLORIDE 5; 20 MG/1; MG/1
AS DIRECTED CAPSULE ORAL
Status: ACTIVE | COMMUNITY

## 2024-10-04 RX ORDER — FAMOTIDINE 20 MG/1
1 TABLET AT BEDTIME AS NEEDED TABLET, FILM COATED ORAL ONCE A DAY
Qty: 90 TABLET | Refills: 11 | Status: ACTIVE | COMMUNITY

## 2024-10-04 RX ORDER — ONDANSETRON HYDROCHLORIDE 4 MG/1
1 TABLET EVERY 6-8HRS AS NEEDED FOR NAUSEA TABLET, FILM COATED ORAL
Qty: 30 | Refills: 3 | Status: ACTIVE | COMMUNITY

## 2024-10-04 NOTE — HPI-OTHER HISTORIES
8/21/2019 Bettye is a new patient referred for gastritis. She states that she has had issues with dysphagia over the last few months. She has long standing reflux and has been on aciphex. More recently she feels like this is not as effective. She has solid food dysphagia. She has not had any liquid food dysphagia. She has a history of candidal esophagitis in the past while she is on prednisone. She is on Humira now for RA EGD was essentially normal. negative for PUD or H pylori. Today she returns and is feeling well. She thinks some of her upper Gi sx are anxiety related. Things are somewhat better now. She does not want to change from Aciphex at this point. She is having some constipation. She is using prn Dulcolax. Miralax causedbloating. her weight is stable. 8/21/19 Bettye returns for follow up/for a new issue. She reports that she has had longstanding issues with mild constipation. She has taken dulcolax for this. Now she is requiring addition of miralax and she does notice some increasing bloating and gas with miralax. She had also had worsening loose stools after starting miralax sos she has had to cut back. She is wondering if there are other options. She is not having any blood in her stools. She has a decreased appetite and some foods taste different to her. She denies any otehr abdominal sx. Her upper GI symptoms seem much improved since we saw her in 2017   1/26/2021 Ms. Bettye Coto is here for nausea, loss of appetite, reflux, and constipation. She was last seen for reflux in 2017 with a normal EGD. She has been on aciphex and her symptoms were thought to be anxiety driven. She started having nausea and loss of appetite just before [place]. Foods just did not taste good. She admits to having a lot of stress at that time.  She was seen in 2019 for constipation. She had a colonoscopy that was normal except for a looping colon. She has been on miralax 0.5 cap a day. She has had trouble regulating her bowels. If she takes too much miralax she will have diarrhea and if too little she will have hard balls. She is having a lot of gas and rumbling. She has seen a small amount of blood when she wipes. She has a hx of internal hemorrhoids. CS  2/16/2021 Ms. Bettye Coto is here for nausea, loss of appetite, reflux, and constipation. She was last seen for reflux in 2017 with a normal EGD. She has been on aciphex and her symptoms were thought to be anxiety driven. She started having nausea and loss of appetite just before [place]. Foods just did not taste good. She was changed from aciphex to protonix 40mg BID. After 2 weeks, her reflux got worse. She started having a lot of regurgitation and worse nausea. She called the office and was changed back to aciphex and given AMT. She took two doses and stopped. She was having severe dry mouth and sedation.  She had a colonoscopy in 2019 that was normal except for a looping colon. She has been working on finding the right dose of miralax and this is working well. CS   4/1/2021 Ms. Bettye Coto is here for f/u of nausea, loss of appetite, reflux, and constipation. She had been on aciphex and doing well until just before [place]. She started having nausea and loss of appetite. She was changed from aciphex to protonix 40mg BID. After 2 weeks, her reflux got worse with regurgitation and nausea. She was changed back to aciphex and AMT was started. She took two doses  of the AMT and stopped due to severe dry mouth and sedation. Her EGD was repeated with mild gastritis. Today, she feels her reflux is better with back on the aciphex. She is still having some nasuea nad scared to eat. Her weight is down a few more pounds. She wonders if gluten is the issue. She has not been able to tolerate Buspar in the past. She is seeing her PCP later this month and will talk to him about her stress/anxiety. CS   7/1/2021 Ms. Bettye Coto is here for f/u of nausea, loss of appetite, reflux, and constipation. Since her last OV, her reflux and nausea had been well controlled. Her appetite had also improved. She had only one episode of nausea after eating steak and peppers.  Her constipation is well controlled with fiber and miralax. The miralax every 3-4 weeks works a little too well and causes diarrhea. CS   12/30/2021 Ms. Bettye Coto is here for f/u of nausea, loss of appetite, reflux, and constipation. Today, her reflux is well controlled. She denies any return of nausea or loss of appetite. Her main complaint today is change in stool and fecal incontinence. She is usually constipated and takes fiber and miralax prn. She was started on methotrexate injection and has been under a lot of stress during this holiday season. She is having sticky stools after she eats that is hard to get all out and has had some fecal incontinence. She took half a AMT and it helped. It just made her so sleepy- she did take it during the day. She wonders if she should stop the methotrexate.  She also has started having bladder incontinence and urology told her that her bladder had dropped. CS   1/14/2022 Ms. Bettye Coto is evaluated via telehealth for f/u of bowel habit change. At her last OV, she denies any reflux or return of nausea or loss of appetite. She was having sticky stools after eating that was hard to get all out with fecal incontinence. She was told to continue fiber, hold the miralax, and given bentyl. She took bentyl for one day and the BM after eating stopped. She was then back to constipated. She has added back miralax. She is back to having gas and bloating and trouble getting the right miralax dose. CS  3/11/2022 Ms. Bettye Coto is evaluated via telehealth for f/u of bowel habit change. She is taking aciphex and her reflux is well controlled. She is taking the miralax and her bowels are now normal. If she gets a little backed up she will add gasx but this is rare. Overall, she is doing really well from a GI standpoint. She may need bladder surgery soon. CS  9/7/2022 Ms. Bettye Coto is here for f/u of GERD, IBS-C, and hospital f/u of SBO. Since her last OV, she has a hysterectomy and pelvic floor surgery by Dr Jett in June. A month later, she started having nausea and diarrhea. She was feeling really weak and dehydrated so she went to the ER. She was found to have a SBO. She had an NGT. After about 7 days, she had a PICC line and TPN. ON day 8, her NGT was removed and her diet was advanced. She did not require surgery. Since ariving home, she has had a lot of gas, bloating, and incomplete BM. She feels constipated but can not have a BM or only a small amount comes out. She is taking fiber daily and miralax prn.  Her  was dx with dementia and this has increased her anxiety and stress level. CS  1/25/2023 Ms. Bettye Coto is here for f/u of GERD, IBS-C, and SBO. Last summer, she had a hysterectomy and pelvic floor surgery by Dr Jett in June.  She started having nausea and diarrhea and found to have a SBO. This was felt to be surgery related. After the SBO, she had a lot of gas, bloating, and incomplete BM. She was given a round of xifaxan and her bloating and gas improved until she went to a holiday party in December. She was given a round of flagyl and this helped. Today, her main issue is constipation. The miralax takes a week to work and then works too well. She has been eating very little veggies. Nutrition supplements have helped and her weight is up.  Her  was dx with dementia and this has increased her anxiety and stress level. CS  5/22/2023 Ms. Bettye Coto is here for f/u of GERD, IBS-C, and SBO. Last summer, she had a hysterectomy and pelvic floor surgery by Dr Jett in June.  She started having nausea and diarrhea and found to have a SBO. It has taken some time but she is finally starting to feel better. Her weight is stable. She denies any further gas or bloating. She is taking fiber at night and playing with the miralax dose. She has not been able to find the right amount to keep her regular without diarrhea. She is having to wipe a lot and now has a fissure that is painful. CS  9/5/2023 Ms. 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 Her reflux is well controlled with aciphex. She has needed the pepcid. She just got her motegrity a week ago. She has been using dulcolax and miralax since her last OV. She feels full and has not had a BM in 4 days since starting the motegrity. She denies any nausea or vomiting. She has not needed any zofran. Her fissure is better with the ointment. CS  12/5/2023 Ms. 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 Her reflux is well controlled with aciphex. She started the motegrity and it was doing well until 11/13. She started going too much and was having a lot of cramping from it. She was changed to EOD but then got constipated. She has had to add miralax again. The ibsrela does help when she gets super backed up. She denies any nausea or vomiting. CS  2/6/2024 Ms. 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 Her reflux is well controlled with aciphex. She has not been able to tolerate the motegrity it never let her know when she would have a BM. She started having nasuea, reflux, and diarrhea. She started a round of flagyl and this is helping. She has not been able to get her bowels on a schedule. She continues to have a lot of stress with her 's dementia. CS  5/7/2024 Ms. 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 Her reflux is doing well. She is no longer taking aciphex or pepcid. Her BM improved with pelvic floor therapy. She has had to cancel a few sessions and had more stress with selling her home and her constipation is back. She is taking miralax and dulcolax prn. She had some bleeding but this was minimal. Overall, she is feeling ok. She continues to have a lot of stress with her 's dementia. She is moving to [facility]. CS  8/8/2024 Ms. 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 Her reflux is doing well. She is no longer taking aciphex or pepcid. Her BM were improved with pelvic floor therapy, miralax and dulcolax until her stress increased. Her 's dementia has gotten worse since moving. She is not able to relax. She is not able to go to the bathroom- she had some samples of ibserla and it helped.CS

## 2024-10-04 NOTE — HPI-TODAY'S VISIT:
10/4/2024 Ms. 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 Her reflux is doing well. She is no longer taking aciphex or pepcid. Her BM were improved with pelvic floor therapy, miralax and dulcolax until her stress increased. Her 's dementia has gotten worse since moving. She is not able to relax. She is not able to go to the bathroom- she had some samples of ibserla and it helped.CS

## 2025-04-03 ENCOUNTER — OFFICE VISIT (OUTPATIENT)
Dept: URBAN - NONMETROPOLITAN AREA CLINIC 13 | Facility: CLINIC | Age: 75
End: 2025-04-03
Payer: MEDICARE

## 2025-04-03 DIAGNOSIS — K60.2 ANAL FISSURE: ICD-10-CM

## 2025-04-03 DIAGNOSIS — K21.9 GERD: ICD-10-CM

## 2025-04-03 DIAGNOSIS — K56.609 SBO (SMALL BOWEL OBSTRUCTION): ICD-10-CM

## 2025-04-03 DIAGNOSIS — K59.04 CHRONIC IDIOPATHIC CONSTIPATION: ICD-10-CM

## 2025-04-03 DIAGNOSIS — K59.00 CONSTIPATION, UNSPECIFIED CONSTIPATION TYPE: ICD-10-CM

## 2025-04-03 DIAGNOSIS — R11.0 NAUSEA: ICD-10-CM

## 2025-04-03 DIAGNOSIS — K58.0 IRRITABLE BOWEL SYNDROME WITH DIARRHEA: ICD-10-CM

## 2025-04-03 PROCEDURE — 99213 OFFICE O/P EST LOW 20 MIN: CPT | Performed by: NURSE PRACTITIONER

## 2025-04-03 RX ORDER — RABEPRAZOLE SODIUM 20 MG/1
TAKE 1 TABLET (20 MG) BY ORAL ROUTE ONCE DAILY SWALLOWING WHOLE. DO NOT CRUSH, CHEW AND/OR DIVIDE TABLET, DELAYED RELEASE ORAL 1
Qty: 90 | Refills: 3
Start: 2025-04-03

## 2025-04-03 RX ORDER — EPINASTINE HYDROCHLORIDE 0.5 MG/ML
1 DROP INTO AFFECTED EYE SOLUTION/ DROPS OPHTHALMIC TWICE A DAY
Status: ACTIVE | COMMUNITY

## 2025-04-03 RX ORDER — TENAPANOR HYDROCHLORIDE 53.2 MG/1
1 TABLET IMMEDIATELY BEFORE MEALS TABLET ORAL TWICE A DAY
Qty: 180 TABLET | Refills: 3 | Status: ACTIVE | COMMUNITY

## 2025-04-03 RX ORDER — DICLOFENAC SODIUM 10 MG/G
APPLY 2 GRAM TO THE AFFECTED AREA(S) BY TOPICAL ROUTE 4 TIMES PER DAY GEL TOPICAL
Qty: 1 | Refills: 0 | Status: ACTIVE | COMMUNITY
Start: 1900-01-01

## 2025-04-03 RX ORDER — DICYCLOMINE HYDROCHLORIDE 10 MG/1
1 TABLET AS NEEDED FOR CRAMPING/DIARRHEA CAPSULE ORAL THREE TIMES A DAY
Qty: 90 TABLET | Refills: 6 | Status: DISCONTINUED | COMMUNITY

## 2025-04-03 RX ORDER — AMLODIPINE BESYLATE AND BENAZEPRIL HYDROCHLORIDE 5; 20 MG/1; MG/1
AS DIRECTED CAPSULE ORAL
Status: DISCONTINUED | COMMUNITY

## 2025-04-03 RX ORDER — DOCUSATE SODIUM 100 MG/1
1 CAPSULE AS NEEDED CAPSULE ORAL ONCE A DAY
Status: DISCONTINUED | COMMUNITY

## 2025-04-03 RX ORDER — POLYETHYLENE GLYCOL 3350 17 G/DOSE
AS DIRECTED POWDER (GRAM) ORAL
Status: ACTIVE | COMMUNITY

## 2025-04-03 RX ORDER — ONDANSETRON HYDROCHLORIDE 4 MG/1
1 TABLET EVERY 6-8HRS AS NEEDED FOR NAUSEA TABLET, FILM COATED ORAL
Qty: 30 | Refills: 3 | OUTPATIENT
Start: 2025-04-03

## 2025-04-03 RX ORDER — PRUCALOPRIDE 2 MG/1
1 TABLET TABLET, FILM COATED ORAL ONCE A DAY
Qty: 90 TABLET | Refills: 3 | OUTPATIENT
Start: 2025-04-03

## 2025-04-03 RX ORDER — METHENAMINE HIPPURATE 1000 MG/1
1 TABLET TABLET ORAL TWICE A DAY
Status: ACTIVE | COMMUNITY

## 2025-04-03 RX ORDER — TENAPANOR HYDROCHLORIDE 53.2 MG/1
1 TABLET IMMEDIATELY BEFORE MEALS TABLET ORAL TWICE A DAY
Qty: 180 TABLET | Refills: 3 | OUTPATIENT
Start: 2025-04-03

## 2025-04-03 RX ORDER — ESTRADIOL 10 UG/1
_INSERT 1 TABLET (10 MCG) BY VAGINAL ROUTE TWICE WEEKLY INSERT VAGINAL
Qty: 1 | Refills: 0 | Status: ACTIVE | COMMUNITY
Start: 1900-01-01

## 2025-04-03 RX ORDER — RABEPRAZOLE SODIUM 20 MG/1
TAKE 1 TABLET (20 MG) BY ORAL ROUTE ONCE DAILY SWALLOWING WHOLE. DO NOT CRUSH, CHEW AND/OR DIVIDE TABLET, DELAYED RELEASE ORAL 1
Qty: 90 | Refills: 3 | Status: ACTIVE | COMMUNITY

## 2025-04-03 RX ORDER — TRAMADOL HYDROCHLORIDE 50 MG/1
TAKE 1 TABLET (50 MG) BY ORAL ROUTE EVERY 6 HOURS AS NEEDED TABLET, FILM COATED ORAL
Qty: 0 | Refills: 0 | Status: ACTIVE | COMMUNITY
Start: 1900-01-01

## 2025-04-03 RX ORDER — ADALIMUMAB 40MG/0.8ML
INJECT 0.8 MILLILITER (40 MG) BY SUBCUTANEOUS ROUTE EVERY 2 WEEKS KIT SUBCUTANEOUS
Qty: 1 | Refills: 0 | Status: ACTIVE | COMMUNITY
Start: 1900-01-01

## 2025-04-03 RX ORDER — BUSPIRONE HYDROCHLORIDE 7.5 MG/1
1 TABLET TABLET ORAL TWICE A DAY
Qty: 60 TABLET | Refills: 5 | OUTPATIENT
Start: 2025-04-03 | End: 2025-09-30

## 2025-04-03 RX ORDER — FAMOTIDINE 20 MG/1
1 TABLET AT BEDTIME AS NEEDED TABLET, FILM COATED ORAL ONCE A DAY
Qty: 90 TABLET | Refills: 11 | Status: DISCONTINUED | COMMUNITY

## 2025-04-03 RX ORDER — TRAZODONE HYDROCHLORIDE 50 MG/1
1 TABLET AT BEDTIME AS NEEDED TABLET ORAL ONCE A DAY
Status: DISCONTINUED | COMMUNITY

## 2025-04-03 RX ORDER — CHOLECALCIFEROL (VITAMIN D3) 50 MCG
1 CAPSULE CAPSULE ORAL ONCE A DAY
Status: ACTIVE | COMMUNITY

## 2025-04-03 RX ORDER — BUSPIRONE HYDROCHLORIDE 7.5 MG/1
1 TABLET TABLET ORAL TWICE A DAY
Qty: 60 TABLET | Refills: 5 | Status: DISCONTINUED | COMMUNITY

## 2025-04-03 RX ORDER — FAMOTIDINE 20 MG/1
1 TABLET AT BEDTIME AS NEEDED TABLET, FILM COATED ORAL ONCE A DAY
Qty: 90 TABLET | Refills: 11 | OUTPATIENT
Start: 2025-04-03

## 2025-04-03 RX ORDER — PRUCALOPRIDE 2 MG/1
1 TABLET TABLET, FILM COATED ORAL ONCE A DAY
Qty: 90 TABLET | Refills: 3 | Status: ACTIVE | COMMUNITY

## 2025-04-03 RX ORDER — CLONAZEPAM 0.5 MG/1
TAKE 1 TABLET BY ORAL ROUTE ONCE A DAY (AT BEDTIME) TABLET ORAL 1
Qty: 0 | Refills: 0 | Status: ON HOLD | COMMUNITY
Start: 1900-01-01

## 2025-04-03 RX ORDER — ONDANSETRON HYDROCHLORIDE 4 MG/1
1 TABLET EVERY 6-8HRS AS NEEDED FOR NAUSEA TABLET, FILM COATED ORAL
Qty: 30 | Refills: 3 | Status: ACTIVE | COMMUNITY

## 2025-04-03 NOTE — HPI-TODAY'S VISIT:
4/3/2025 Ms. 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 Her reflux is doing well. She is back on aciphex. She is no longer having any nausea. She feels this was stress induced. Since her  passed this has been better. She is taking motegrity daily and still needing dulcolax 2 x a week to have a BM. CS

## 2025-04-03 NOTE — HPI-OTHER HISTORIES
8/21/2019 Bettye is a new patient referred for gastritis. She states that she has had issues with dysphagia over the last few months. She has long standing reflux and has been on aciphex. More recently she feels like this is not as effective. She has solid food dysphagia. She has not had any liquid food dysphagia. She has a history of candidal esophagitis in the past while she is on prednisone. She is on Humira now for RA EGD was essentially normal. negative for PUD or H pylori. Today she returns and is feeling well. She thinks some of her upper Gi sx are anxiety related. Things are somewhat better now. She does not want to change from Aciphex at this point. She is having some constipation. She is using prn Dulcolax. Miralax causedbloating. her weight is stable. 8/21/19 Bettye returns for follow up/for a new issue. She reports that she has had longstanding issues with mild constipation. She has taken dulcolax for this. Now she is requiring addition of miralax and she does notice some increasing bloating and gas with miralax. She had also had worsening loose stools after starting miralax sos she has had to cut back. She is wondering if there are other options. She is not having any blood in her stools. She has a decreased appetite and some foods taste different to her. She denies any otehr abdominal sx. Her upper GI symptoms seem much improved since we saw her in 2017   1/26/2021 Ms. Bettye Coto is here for nausea, loss of appetite, reflux, and constipation. She was last seen for reflux in 2017 with a normal EGD. She has been on aciphex and her symptoms were thought to be anxiety driven. She started having nausea and loss of appetite just before [place]. Foods just did not taste good. She admits to having a lot of stress at that time.  She was seen in 2019 for constipation. She had a colonoscopy that was normal except for a looping colon. She has been on miralax 0.5 cap a day. She has had trouble regulating her bowels. If she takes too much miralax she will have diarrhea and if too little she will have hard balls. She is having a lot of gas and rumbling. She has seen a small amount of blood when she wipes. She has a hx of internal hemorrhoids. CS  2/16/2021 Ms. Bettye Coto is here for nausea, loss of appetite, reflux, and constipation. She was last seen for reflux in 2017 with a normal EGD. She has been on aciphex and her symptoms were thought to be anxiety driven. She started having nausea and loss of appetite just before [place]. Foods just did not taste good. She was changed from aciphex to protonix 40mg BID. After 2 weeks, her reflux got worse. She started having a lot of regurgitation and worse nausea. She called the office and was changed back to aciphex and given AMT. She took two doses and stopped. She was having severe dry mouth and sedation.  She had a colonoscopy in 2019 that was normal except for a looping colon. She has been working on finding the right dose of miralax and this is working well. CS   4/1/2021 Ms. Bettye Coto is here for f/u of nausea, loss of appetite, reflux, and constipation. She had been on aciphex and doing well until just before [place]. She started having nausea and loss of appetite. She was changed from aciphex to protonix 40mg BID. After 2 weeks, her reflux got worse with regurgitation and nausea. She was changed back to aciphex and AMT was started. She took two doses  of the AMT and stopped due to severe dry mouth and sedation. Her EGD was repeated with mild gastritis. Today, she feels her reflux is better with back on the aciphex. She is still having some nasuea nad scared to eat. Her weight is down a few more pounds. She wonders if gluten is the issue. She has not been able to tolerate Buspar in the past. She is seeing her PCP later this month and will talk to him about her stress/anxiety. CS   7/1/2021 Ms. Bettye Coto is here for f/u of nausea, loss of appetite, reflux, and constipation. Since her last OV, her reflux and nausea had been well controlled. Her appetite had also improved. She had only one episode of nausea after eating steak and peppers.  Her constipation is well controlled with fiber and miralax. The miralax every 3-4 weeks works a little too well and causes diarrhea. CS   12/30/2021 Ms. Bettye Coto is here for f/u of nausea, loss of appetite, reflux, and constipation. Today, her reflux is well controlled. She denies any return of nausea or loss of appetite. Her main complaint today is change in stool and fecal incontinence. She is usually constipated and takes fiber and miralax prn. She was started on methotrexate injection and has been under a lot of stress during this holiday season. She is having sticky stools after she eats that is hard to get all out and has had some fecal incontinence. She took half a AMT and it helped. It just made her so sleepy- she did take it during the day. She wonders if she should stop the methotrexate.  She also has started having bladder incontinence and urology told her that her bladder had dropped. CS   1/14/2022 Ms. Bettye Coto is evaluated via telehealth for f/u of bowel habit change. At her last OV, she denies any reflux or return of nausea or loss of appetite. She was having sticky stools after eating that was hard to get all out with fecal incontinence. She was told to continue fiber, hold the miralax, and given bentyl. She took bentyl for one day and the BM after eating stopped. She was then back to constipated. She has added back miralax. She is back to having gas and bloating and trouble getting the right miralax dose. CS  3/11/2022 Ms. Btetye Coto is evaluated via telehealth for f/u of bowel habit change. She is taking aciphex and her reflux is well controlled. She is taking the miralax and her bowels are now normal. If she gets a little backed up she will add gasx but this is rare. Overall, she is doing really well from a GI standpoint. She may need bladder surgery soon. CS  9/7/2022 Ms. Bettye Coto is here for f/u of GERD, IBS-C, and hospital f/u of SBO. Since her last OV, she has a hysterectomy and pelvic floor surgery by Dr Jett in June. A month later, she started having nausea and diarrhea. She was feeling really weak and dehydrated so she went to the ER. She was found to have a SBO. She had an NGT. After about 7 days, she had a PICC line and TPN. ON day 8, her NGT was removed and her diet was advanced. She did not require surgery. Since ariving home, she has had a lot of gas, bloating, and incomplete BM. She feels constipated but can not have a BM or only a small amount comes out. She is taking fiber daily and miralax prn.  Her  was dx with dementia and this has increased her anxiety and stress level. CS  1/25/2023 Ms. Bettye Coto is here for f/u of GERD, IBS-C, and SBO. Last summer, she had a hysterectomy and pelvic floor surgery by Dr Jett in June.  She started having nausea and diarrhea and found to have a SBO. This was felt to be surgery related. After the SBO, she had a lot of gas, bloating, and incomplete BM. She was given a round of xifaxan and her bloating and gas improved until she went to a holiday party in December. She was given a round of flagyl and this helped. Today, her main issue is constipation. The miralax takes a week to work and then works too well. She has been eating very little veggies. Nutrition supplements have helped and her weight is up.  Her  was dx with dementia and this has increased her anxiety and stress level. CS  5/22/2023 Ms. Bettye Coto is here for f/u of GERD, IBS-C, and SBO. Last summer, she had a hysterectomy and pelvic floor surgery by Dr Jett in June.  She started having nausea and diarrhea and found to have a SBO. It has taken some time but she is finally starting to feel better. Her weight is stable. She denies any further gas or bloating. She is taking fiber at night and playing with the miralax dose. She has not been able to find the right amount to keep her regular without diarrhea. She is having to wipe a lot and now has a fissure that is painful. CS  9/5/2023 Ms. 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 Her reflux is well controlled with aciphex. She has needed the pepcid. She just got her motegrity a week ago. She has been using dulcolax and miralax since her last OV. She feels full and has not had a BM in 4 days since starting the motegrity. She denies any nausea or vomiting. She has not needed any zofran. Her fissure is better with the ointment. CS  12/5/2023 Ms. 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 Her reflux is well controlled with aciphex. She started the motegrity and it was doing well until 11/13. She started going too much and was having a lot of cramping from it. She was changed to EOD but then got constipated. She has had to add miralax again. The ibsrela does help when she gets super backed up. She denies any nausea or vomiting. CS  2/6/2024 Ms. 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 Her reflux is well controlled with aciphex. She has not been able to tolerate the motegrity it never let her know when she would have a BM. She started having nasuea, reflux, and diarrhea. She started a round of flagyl and this is helping. She has not been able to get her bowels on a schedule. She continues to have a lot of stress with her 's dementia. CS  5/7/2024 Ms. 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 Her reflux is doing well. She is no longer taking aciphex or pepcid. Her BM improved with pelvic floor therapy. She has had to cancel a few sessions and had more stress with selling her home and her constipation is back. She is taking miralax and dulcolax prn. She had some bleeding but this was minimal. Overall, she is feeling ok. She continues to have a lot of stress with her 's dementia. She is moving to [facility]. CS  8/8/2024 Ms. 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 Her reflux is doing well. She is no longer taking aciphex or pepcid. Her BM were improved with pelvic floor therapy, miralax and dulcolax until her stress increased. Her 's dementia has gotten worse since moving. She is not able to relax. She is not able to go to the bathroom- she had some samples of ibserla and it helped.CS  10/4/2024 Ms. 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 Her reflux is doing well. She is no longer taking aciphex or pepcid. Her BM were improved with pelvic floor therapy, miralax and dulcolax until her stress increased. Her 's dementia has gotten worse since moving. She is not able to relax. She is not able to go to the bathroom- she had some samples of ibserla and it helped.CS